# Patient Record
Sex: FEMALE | Race: WHITE | NOT HISPANIC OR LATINO
[De-identification: names, ages, dates, MRNs, and addresses within clinical notes are randomized per-mention and may not be internally consistent; named-entity substitution may affect disease eponyms.]

---

## 2019-01-15 ENCOUNTER — APPOINTMENT (OUTPATIENT)
Dept: INTERNAL MEDICINE | Facility: CLINIC | Age: 34
End: 2019-01-15
Payer: COMMERCIAL

## 2019-01-15 ENCOUNTER — TRANSCRIPTION ENCOUNTER (OUTPATIENT)
Age: 34
End: 2019-01-15

## 2019-01-15 VITALS
DIASTOLIC BLOOD PRESSURE: 78 MMHG | SYSTOLIC BLOOD PRESSURE: 118 MMHG | OXYGEN SATURATION: 97 % | HEIGHT: 64 IN | HEART RATE: 69 BPM | BODY MASS INDEX: 25.44 KG/M2 | WEIGHT: 149 LBS

## 2019-01-15 DIAGNOSIS — E61.1 IRON DEFICIENCY: ICD-10-CM

## 2019-01-15 DIAGNOSIS — Z81.4 FAMILY HISTORY OF OTHER SUBSTANCE ABUSE AND DEPENDENCE: ICD-10-CM

## 2019-01-15 DIAGNOSIS — Z00.00 ENCOUNTER FOR GENERAL ADULT MEDICAL EXAMINATION W/OUT ABNORMAL FINDINGS: ICD-10-CM

## 2019-01-15 DIAGNOSIS — Z11.4 ENCOUNTER FOR SCREENING FOR HUMAN IMMUNODEFICIENCY VIRUS [HIV]: ICD-10-CM

## 2019-01-15 DIAGNOSIS — G93.9 DISORDER OF BRAIN, UNSPECIFIED: ICD-10-CM

## 2019-01-15 DIAGNOSIS — K58.1 IRRITABLE BOWEL SYNDROME WITH CONSTIPATION: ICD-10-CM

## 2019-01-15 DIAGNOSIS — Z81.8 FAMILY HISTORY OF OTHER MENTAL AND BEHAVIORAL DISORDERS: ICD-10-CM

## 2019-01-15 DIAGNOSIS — Z82.3 FAMILY HISTORY OF STROKE: ICD-10-CM

## 2019-01-15 DIAGNOSIS — Z82.49 FAMILY HISTORY OF ISCHEMIC HEART DISEASE AND OTHER DISEASES OF THE CIRCULATORY SYSTEM: ICD-10-CM

## 2019-01-15 PROCEDURE — G0442 ANNUAL ALCOHOL SCREEN 15 MIN: CPT

## 2019-01-15 PROCEDURE — 36415 COLL VENOUS BLD VENIPUNCTURE: CPT

## 2019-01-15 PROCEDURE — 99385 PREV VISIT NEW AGE 18-39: CPT | Mod: 25

## 2019-01-15 RX ORDER — FEXOFENADINE HCL 60 MG
TABLET ORAL
Refills: 0 | Status: ACTIVE | COMMUNITY

## 2019-01-15 RX ORDER — UBIDECARENONE/VIT E ACET 100MG-5
CAPSULE ORAL
Refills: 0 | Status: ACTIVE | COMMUNITY

## 2019-01-15 NOTE — ASSESSMENT
[FreeTextEntry1] : In good health. \par Further recommendations based on labs.\par Follows with neurology regularly for monitoring of brain lesion. \par Current with gyn. \par Declined flu vaccine. \par Follow up annually and prn.

## 2019-01-15 NOTE — HEALTH RISK ASSESSMENT
[0] : 2) Feeling down, depressed, or hopeless: Not at all (0) [Patient reported PAP Smear was normal] : Patient reported PAP Smear was normal [] : No [de-identified] : occasional [[id number]] : 0

## 2019-01-15 NOTE — HISTORY OF PRESENT ILLNESS
[de-identified] : Patient presents for initial CPE. History is significant for an incidentally found brain lesion for which she follows with neurology, Dr. Bethea, regularly. Her only medications are allegra/flonase for chornic postnasal drip. She has a history of low iron and hypothyroidism that was not treated. She complains of occasional restless legs, otherwise feels well. \par She is engaged, works as a speech pathologist in a school/private practice.

## 2019-01-15 NOTE — PHYSICAL EXAM

## 2019-01-16 DIAGNOSIS — R79.89 OTHER SPECIFIED ABNORMAL FINDINGS OF BLOOD CHEMISTRY: ICD-10-CM

## 2019-01-17 ENCOUNTER — TRANSCRIPTION ENCOUNTER (OUTPATIENT)
Age: 34
End: 2019-01-17

## 2019-01-17 LAB
25(OH)D3 SERPL-MCNC: 32.1 NG/ML
ALBUMIN SERPL ELPH-MCNC: 4.3 G/DL
ALP BLD-CCNC: 36 U/L
ALT SERPL-CCNC: 18 U/L
ANION GAP SERPL CALC-SCNC: 11 MMOL/L
AST SERPL-CCNC: 21 U/L
BASOPHILS # BLD AUTO: 0.03 K/UL
BASOPHILS NFR BLD AUTO: 0.7 %
BILIRUB SERPL-MCNC: 0.5 MG/DL
BUN SERPL-MCNC: 9 MG/DL
CALCIUM SERPL-MCNC: 9.4 MG/DL
CHLORIDE SERPL-SCNC: 107 MMOL/L
CHOLEST SERPL-MCNC: 139 MG/DL
CHOLEST/HDLC SERPL: 2.2 RATIO
CO2 SERPL-SCNC: 23 MMOL/L
CREAT SERPL-MCNC: 0.78 MG/DL
EOSINOPHIL # BLD AUTO: 0.07 K/UL
EOSINOPHIL NFR BLD AUTO: 1.6 %
FERRITIN SERPL-MCNC: 33 NG/ML
GLUCOSE SERPL-MCNC: 90 MG/DL
HCT VFR BLD CALC: 44 %
HDLC SERPL-MCNC: 63 MG/DL
HGB BLD-MCNC: 14.3 G/DL
HIV1+2 AB SPEC QL IA.RAPID: NONREACTIVE
IMM GRANULOCYTES NFR BLD AUTO: 0 %
IRON SERPL-MCNC: 65 UG/DL
LDLC SERPL CALC-MCNC: 65 MG/DL
LYMPHOCYTES # BLD AUTO: 1.91 K/UL
LYMPHOCYTES NFR BLD AUTO: 42.7 %
MAN DIFF?: NORMAL
MCHC RBC-ENTMCNC: 32.4 PG
MCHC RBC-ENTMCNC: 32.5 GM/DL
MCV RBC AUTO: 99.5 FL
MONOCYTES # BLD AUTO: 0.6 K/UL
MONOCYTES NFR BLD AUTO: 13.4 %
NEUTROPHILS # BLD AUTO: 1.86 K/UL
NEUTROPHILS NFR BLD AUTO: 41.6 %
PLATELET # BLD AUTO: 280 K/UL
POTASSIUM SERPL-SCNC: 4.4 MMOL/L
PROT SERPL-MCNC: 6.7 G/DL
RBC # BLD: 4.42 M/UL
RBC # FLD: 14 %
SODIUM SERPL-SCNC: 141 MMOL/L
T4 FREE SERPL-MCNC: 1.2 NG/DL
TRIGL SERPL-MCNC: 54 MG/DL
TSH SERPL-ACNC: 6.91 UIU/ML
VIT B12 SERPL-MCNC: 633 PG/ML
WBC # FLD AUTO: 4.47 K/UL

## 2019-02-25 ENCOUNTER — TRANSCRIPTION ENCOUNTER (OUTPATIENT)
Age: 34
End: 2019-02-25

## 2019-03-04 ENCOUNTER — TRANSCRIPTION ENCOUNTER (OUTPATIENT)
Age: 34
End: 2019-03-04

## 2019-03-22 ENCOUNTER — APPOINTMENT (OUTPATIENT)
Dept: INTERNAL MEDICINE | Facility: CLINIC | Age: 34
End: 2019-03-22
Payer: COMMERCIAL

## 2019-03-22 VITALS
TEMPERATURE: 97.6 F | HEIGHT: 64 IN | BODY MASS INDEX: 24.59 KG/M2 | WEIGHT: 144 LBS | OXYGEN SATURATION: 98 % | DIASTOLIC BLOOD PRESSURE: 78 MMHG | HEART RATE: 81 BPM | SYSTOLIC BLOOD PRESSURE: 118 MMHG

## 2019-03-22 PROCEDURE — 99214 OFFICE O/P EST MOD 30 MIN: CPT

## 2019-03-22 RX ORDER — ALBUTEROL SULFATE 90 UG/1
108 (90 BASE) AEROSOL, METERED RESPIRATORY (INHALATION) EVERY 4 HOURS
Qty: 1 | Refills: 0 | Status: ACTIVE | COMMUNITY
Start: 2019-03-22 | End: 1900-01-01

## 2019-03-25 NOTE — ASSESSMENT
[FreeTextEntry1] : Lung clear, but worse at night. Rx given for albuterol inhaler prn and codeine for use at night. Call if no improvement.

## 2019-03-25 NOTE — HISTORY OF PRESENT ILLNESS
[FreeTextEntry8] : Patient complains of dry cough worse at night for a week. She went to urgent care last weekend and was given an rx for medrol with no relief. Yesterday felt like she couldn't take a deep breath. No wheezing, fever, chills, myalgias. Has had sinus congestion on and off for a few months, taking Allergra D, nasal spray. She is a never smoker, no history of asthma.

## 2019-03-25 NOTE — PHYSICAL EXAM
[No Acute Distress] : no acute distress [Well Nourished] : well nourished [Well Developed] : well developed [No Respiratory Distress] : no respiratory distress  [Clear to Auscultation] : lungs were clear to auscultation bilaterally [No Accessory Muscle Use] : no accessory muscle use [Normal Rate] : normal rate  [Regular Rhythm] : with a regular rhythm [Normal S1, S2] : normal S1 and S2 [No Murmur] : no murmur heard [Normal Affect] : the affect was normal [Normal Insight/Judgement] : insight and judgment were intact [de-identified] : pnd

## 2019-03-25 NOTE — REVIEW OF SYSTEMS
[Nasal Discharge] : nasal discharge [Shortness Of Breath] : shortness of breath [Wheezing] : no wheezing [Cough] : cough [Negative] : Heme/Lymph

## 2019-05-09 ENCOUNTER — APPOINTMENT (OUTPATIENT)
Dept: INTERNAL MEDICINE | Facility: CLINIC | Age: 34
End: 2019-05-09

## 2019-05-17 ENCOUNTER — LABORATORY RESULT (OUTPATIENT)
Age: 34
End: 2019-05-17

## 2019-05-17 ENCOUNTER — APPOINTMENT (OUTPATIENT)
Dept: INTERNAL MEDICINE | Facility: CLINIC | Age: 34
End: 2019-05-17
Payer: COMMERCIAL

## 2019-05-17 VITALS
BODY MASS INDEX: 24.59 KG/M2 | WEIGHT: 144 LBS | HEART RATE: 85 BPM | HEIGHT: 64 IN | SYSTOLIC BLOOD PRESSURE: 124 MMHG | TEMPERATURE: 97.7 F | DIASTOLIC BLOOD PRESSURE: 80 MMHG | OXYGEN SATURATION: 98 %

## 2019-05-17 DIAGNOSIS — J30.9 ALLERGIC RHINITIS, UNSPECIFIED: ICD-10-CM

## 2019-05-17 DIAGNOSIS — R05 COUGH: ICD-10-CM

## 2019-05-17 PROCEDURE — 36415 COLL VENOUS BLD VENIPUNCTURE: CPT

## 2019-05-17 PROCEDURE — 94010 BREATHING CAPACITY TEST: CPT

## 2019-05-17 PROCEDURE — 99214 OFFICE O/P EST MOD 30 MIN: CPT | Mod: 25

## 2019-05-17 RX ORDER — BUDESONIDE 90 UG/1
90 AEROSOL, POWDER RESPIRATORY (INHALATION)
Qty: 1 | Refills: 5 | Status: ACTIVE | COMMUNITY
Start: 2019-05-17 | End: 1900-01-01

## 2019-05-17 RX ORDER — GUAIFENESIN AND CODEINE PHOSPHATE 100; 10 MG/5ML; MG/5ML
100-10 SYRUP ORAL
Qty: 150 | Refills: 0 | Status: DISCONTINUED | COMMUNITY
Start: 2019-03-22 | End: 2019-05-17

## 2019-05-17 RX ORDER — MONTELUKAST 10 MG/1
10 TABLET, FILM COATED ORAL DAILY
Qty: 30 | Refills: 5 | Status: ACTIVE | COMMUNITY
Start: 2019-05-17 | End: 1900-01-01

## 2019-05-17 NOTE — END OF VISIT
[FreeTextEntry3] : All medical record entries made by the Scribe were at my, Dr. Ivey's, direction and personally dictated by me on [5/17/19]. I have reviewed the chart and agree that the record accurately reflects my personal performance of the history, physical exam, assessment and plan. I have also personally directed, reviewed, and agreed with the chart.\par

## 2019-05-17 NOTE — ADDENDUM
[FreeTextEntry1] : I, Mary Lange, acted solely as a scribe for Dr. Ivey on this date [5/17/19]. \par

## 2019-05-17 NOTE — PHYSICAL EXAM
[No Acute Distress] : no acute distress [Well Nourished] : well nourished [Well Developed] : well developed [Normal Outer Ear/Nose] : the outer ears and nose were normal in appearance [No Respiratory Distress] : no respiratory distress  [Normal Oropharynx] : the oropharynx was normal [No Accessory Muscle Use] : no accessory muscle use [Clear to Auscultation] : lungs were clear to auscultation bilaterally [Regular Rhythm] : with a regular rhythm [Normal Rate] : normal rate  [Normal S1, S2] : normal S1 and S2 [No Murmur] : no murmur heard [Alert and Oriented x3] : oriented to person, place, and time [Normal Affect] : the affect was normal [Normal Insight/Judgement] : insight and judgment were intact [de-identified] : PND noted, mild maxillary tenderness  [de-identified] : right sided, tender, 1cm, swollen submandibular lymphadenopathy

## 2019-05-17 NOTE — REVIEW OF SYSTEMS
[Sore Throat] : sore throat [Postnasal Drip] : postnasal drip [Shortness Of Breath] : shortness of breath [Cough] : cough [Wheezing] : wheezing [Swollen Glands] : swollen glands [Fever] : no fever [Chills] : no chills

## 2019-05-17 NOTE — ASSESSMENT
[FreeTextEntry1] : Rx given for singulair for cough, allergies. \par Rx given for pulmicort inhaler. Use daily, rinse out mouth after use. \par Use albuterol inhaler if needed. \par Follow up with ENT for possible deviated septum surgery, will consider allergy eval if no improvement in symptoms. \par Labs drawn in office. Further recommendations based on lab results.

## 2019-05-17 NOTE — HISTORY OF PRESENT ILLNESS
[FreeTextEntry8] : Patient presents with a cough, sore throat, and swollen glands for the past few weeks. She was here two months ago with a cough and was given a course of steroids which improved the cough for a few days. She went to her ENT last week and he told her to stop taking OTC medications. He gave her a steroid injection which helped her nasal congestion for one day. Her swollen glands and sore throat have resolved, but the cough has persisted. She feels like she has a mild wheeze. She says the cough is worse in the morning and at night, and sometimes she feels like the cough makes it difficult to catch her breath. She has had similar symptoms for years and has undergone allergy workup that was negative in the past. No fever, no chills. No hx of asthma. No known allergies. Never a smoker.

## 2019-05-23 ENCOUNTER — TRANSCRIPTION ENCOUNTER (OUTPATIENT)
Age: 34
End: 2019-05-23

## 2019-05-23 LAB
A ALTERNATA IGE QN: <0.1 KUA/L
A ALTERNATA IGE QN: <0.1 KUA/L
A FUMIGATUS IGE QN: <0.1 KUA/L
A FUMIGATUS IGE QN: <0.1 KUA/L
C ALBICANS IGE QN: <0.1 KUA/L
C HERBARUM IGE QN: <0.1 KUA/L
C HERBARUM IGE QN: <0.1 KUA/L
CAT DANDER IGE QN: <0.1 KUA/L
COMMON RAGWEED IGE QN: <0.1 KUA/L
D FARINAE IGE QN: <0.1 KUA/L
D PTERONYSS IGE QN: <0.1 KUA/L
DEPRECATED A ALTERNATA IGE RAST QL: 0
DEPRECATED A ALTERNATA IGE RAST QL: 0
DEPRECATED A FUMIGATUS IGE RAST QL: 0
DEPRECATED A FUMIGATUS IGE RAST QL: 0
DEPRECATED C ALBICANS IGE RAST QL: 0
DEPRECATED C HERBARUM IGE RAST QL: 0
DEPRECATED C HERBARUM IGE RAST QL: 0
DEPRECATED CAT DANDER IGE RAST QL: 0
DEPRECATED COMMON RAGWEED IGE RAST QL: 0
DEPRECATED D FARINAE IGE RAST QL: 0
DEPRECATED D PTERONYSS IGE RAST QL: 0
DEPRECATED DOG DANDER IGE RAST QL: 0
DEPRECATED M RACEMOSUS IGE RAST QL: 0
DEPRECATED P NOTATUM IGE RAST QL: 0
DEPRECATED ROACH IGE RAST QL: 0
DEPRECATED S ROSTRATA IGE RAST QL: 0
DEPRECATED TIMOTHY IGE RAST QL: 0
DEPRECATED WHITE OAK IGE RAST QL: 0
DOG DANDER IGE QN: <0.1 KUA/L
M RACEMOSUS IGE QN: <0.1 KUA/L
P NOTATUM IGE QN: <0.1 KUA/L
ROACH IGE QN: <0.1 KUA/L
S ROSTRATA IGE QN: <0.1 KUA/L
TIMOTHY IGE QN: <0.1 KUA/L
WHITE OAK IGE QN: <0.1 KUA/L

## 2019-05-30 ENCOUNTER — RESULT REVIEW (OUTPATIENT)
Age: 34
End: 2019-05-30

## 2019-07-10 ENCOUNTER — APPOINTMENT (OUTPATIENT)
Dept: INTERNAL MEDICINE | Facility: CLINIC | Age: 34
End: 2019-07-10

## 2019-07-10 ENCOUNTER — APPOINTMENT (OUTPATIENT)
Dept: INTERNAL MEDICINE | Facility: CLINIC | Age: 34
End: 2019-07-10
Payer: COMMERCIAL

## 2019-07-10 VITALS
OXYGEN SATURATION: 98 % | BODY MASS INDEX: 24.24 KG/M2 | DIASTOLIC BLOOD PRESSURE: 74 MMHG | WEIGHT: 142 LBS | HEIGHT: 64 IN | SYSTOLIC BLOOD PRESSURE: 106 MMHG | RESPIRATION RATE: 13 BRPM | HEART RATE: 81 BPM

## 2019-07-10 DIAGNOSIS — Z71.89 OTHER SPECIFIED COUNSELING: ICD-10-CM

## 2019-07-10 DIAGNOSIS — T75.3XXA MOTION SICKNESS, INITIAL ENCOUNTER: ICD-10-CM

## 2019-07-10 DIAGNOSIS — G25.81 RESTLESS LEGS SYNDROME: ICD-10-CM

## 2019-07-10 PROCEDURE — 99214 OFFICE O/P EST MOD 30 MIN: CPT

## 2019-07-10 RX ORDER — ROPINIROLE 0.25 MG/1
0.25 TABLET, FILM COATED ORAL
Qty: 30 | Refills: 0 | Status: ACTIVE | COMMUNITY
Start: 2019-07-10 | End: 1900-01-01

## 2019-07-10 RX ORDER — SCOPOLAMINE 1.5 MG/1
1 PATCH, EXTENDED RELEASE TRANSDERMAL
Qty: 1 | Refills: 0 | Status: ACTIVE | COMMUNITY
Start: 2019-07-10 | End: 1900-01-01

## 2019-07-11 PROBLEM — Z71.89 TRAVEL ADVICE ENCOUNTER: Status: ACTIVE | Noted: 2019-07-11

## 2019-07-11 PROBLEM — G25.81 RESTLESS LEGS: Status: ACTIVE | Noted: 2019-01-15

## 2019-07-11 PROBLEM — T75.3XXA MOTION SICKNESS: Status: ACTIVE | Noted: 2019-07-10

## 2019-07-11 NOTE — END OF VISIT
[Time Spent: ___ minutes] : I have spent [unfilled] minutes of face to face time with the patient [FreeTextEntry3] : All medical record entries made by the Scribe were at my, Dr. Ivey's, direction and personally dictated by me on [7/10/2019]. I have reviewed the chart and agree that the record accurately reflects my personal performance of the history, physical exam, assessment and plan. I have also personally directed, reviewed and agreed with the chart.

## 2019-07-11 NOTE — PHYSICAL EXAM
[No Acute Distress] : no acute distress [Well Nourished] : well nourished [Well Developed] : well developed [Well-Appearing] : well-appearing [Normal Affect] : the affect was normal [Alert and Oriented x3] : oriented to person, place, and time [Normal Insight/Judgement] : insight and judgment were intact [Normal Rate] : normal rate  [No Edema] : there was no peripheral edema [No Respiratory Distress] : no respiratory distress

## 2019-07-11 NOTE — ASSESSMENT
[FreeTextEntry1] : Rx given for scopolamine patch for motion sickness. \par Rx given for ropinirole and restless legs.\par Discussed that she should see a travel clinic for comprehensive travel and vaccine counseling. She declines, states she is just interested in hepatitis A and typhoid vaccines. Rx sent to [facility], she will return next week for administration.

## 2019-07-11 NOTE — HISTORY OF PRESENT ILLNESS
[FreeTextEntry8] : Patient has several concerns regarding travel to [place] next month.\par #1 - she complains of restless legs, worse at night. B12 & ferritin were normal in the past. She is concerned about being symptomatic on the flight and requests medication to treat it. \par #2 - She will be travelling on boats and is concerned about motion sickness. She requests medication to treat it. \par #3 - She is interested in travel vaccinations, specifically typhoid and hepatitis A. \par

## 2019-07-17 ENCOUNTER — APPOINTMENT (OUTPATIENT)
Dept: INTERNAL MEDICINE | Facility: CLINIC | Age: 34
End: 2019-07-17

## 2019-07-17 ENCOUNTER — APPOINTMENT (OUTPATIENT)
Dept: INTERNAL MEDICINE | Facility: CLINIC | Age: 34
End: 2019-07-17
Payer: COMMERCIAL

## 2019-07-17 VITALS
OXYGEN SATURATION: 98 % | HEART RATE: 79 BPM | RESPIRATION RATE: 16 BRPM | SYSTOLIC BLOOD PRESSURE: 108 MMHG | HEIGHT: 64 IN | DIASTOLIC BLOOD PRESSURE: 70 MMHG | BODY MASS INDEX: 24.59 KG/M2 | WEIGHT: 144 LBS

## 2019-07-17 DIAGNOSIS — Z23 ENCOUNTER FOR IMMUNIZATION: ICD-10-CM

## 2019-07-17 PROCEDURE — 90472 IMMUNIZATION ADMIN EACH ADD: CPT

## 2019-07-17 PROCEDURE — 90691 TYPHOID VACCINE IM: CPT | Mod: NC

## 2019-07-17 PROCEDURE — 90471 IMMUNIZATION ADMIN: CPT

## 2019-07-17 PROCEDURE — 90632 HEPA VACCINE ADULT IM: CPT | Mod: NC

## 2019-07-17 RX ORDER — SALMONELLA TYPHI TY2 VI POLYSACCHARIDE ANTIGEN 25 UG/.5ML
25 INJECTION, SOLUTION INTRAMUSCULAR ONCE
Qty: 1 | Refills: 0 | Status: DISCONTINUED | COMMUNITY
Start: 2019-07-11 | End: 2019-07-17

## 2019-07-17 NOTE — ASSESSMENT
[FreeTextEntry1] : Patient presents for Hep A and typhoid vaccines. She has the vaccines with her. \par Havrix given in left deltoid with no localized reaction. \par Typhoid given in right deltoid with no localized reaction.

## 2019-08-22 ENCOUNTER — APPOINTMENT (OUTPATIENT)
Dept: INTERNAL MEDICINE | Facility: CLINIC | Age: 34
End: 2019-08-22

## 2019-10-29 ENCOUNTER — APPOINTMENT (OUTPATIENT)
Dept: INTERNAL MEDICINE | Facility: CLINIC | Age: 34
End: 2019-10-29
Payer: COMMERCIAL

## 2019-10-29 VITALS
DIASTOLIC BLOOD PRESSURE: 73 MMHG | SYSTOLIC BLOOD PRESSURE: 110 MMHG | HEART RATE: 79 BPM | WEIGHT: 150 LBS | BODY MASS INDEX: 25.61 KG/M2 | OXYGEN SATURATION: 99 % | HEIGHT: 64 IN

## 2019-10-29 DIAGNOSIS — M54.2 CERVICALGIA: ICD-10-CM

## 2019-10-29 DIAGNOSIS — M50.30 OTHER CERVICAL DISC DEGENERATION, UNSPECIFIED CERVICAL REGION: ICD-10-CM

## 2019-10-29 PROCEDURE — 99214 OFFICE O/P EST MOD 30 MIN: CPT

## 2019-10-29 RX ORDER — HEPATITIS A VIRUS VACCINE 1440/ML
1440 VIAL (ML) INTRAMUSCULAR ONCE
Qty: 1 | Refills: 0 | Status: DISCONTINUED | COMMUNITY
Start: 2019-07-11 | End: 2019-10-29

## 2019-10-29 RX ORDER — CYCLOBENZAPRINE HYDROCHLORIDE 10 MG/1
10 TABLET, FILM COATED ORAL 3 TIMES DAILY
Qty: 30 | Refills: 0 | Status: ACTIVE | COMMUNITY
Start: 2019-10-29 | End: 1900-01-01

## 2019-10-29 NOTE — ADDENDUM
[FreeTextEntry1] : I, Alicia Hill, acted solely as a scribe for Dr. Ivey on this date [10/29/2019].

## 2019-10-29 NOTE — ASSESSMENT
[FreeTextEntry1] : Rx given for physical therapy, flexeril. Recommended ibuprofen or naproxen prn for pain. Take with food.\par

## 2019-10-29 NOTE — END OF VISIT
[FreeTextEntry3] : All medical record entries made by the Scribe were at my, Dr. Ivey's, direction and personally dictated by me on [10/29/2019]. I have reviewed the chart and agree that the record accurately reflects my personal performance of the history, physical exam, assessment and plan. I have also personally directed, reviewed and agreed with the chart.

## 2019-10-29 NOTE — REVIEW OF SYSTEMS
[Muscle Weakness] : muscle weakness [Negative] : Constitutional [FreeTextEntry9] : neck pain, shoulder pain

## 2019-10-29 NOTE — HISTORY OF PRESENT ILLNESS
[FreeTextEntry8] : Patient presents with chronic neck pain that has been getting worse over the past 2 months. She also complains of arm weakness, shoulder pain, and a sensation that radiates down her arms. She had severe exacerbation of the pain 2 days ago after scrubbing the bathroom. Her range of motion in her neck was limited. History is significant for 2 herniated spinal disks and she has had similar symptoms before. Imaging March 2017 showed herniation at C5/C6 with mild compression of right side of the spinal cord and herniation at C6/C7 without compression of the spinal cord. She has been to the chiropractor in the past with relief. She has been working out more lately, but may cut back due to pain.

## 2019-10-29 NOTE — PHYSICAL EXAM
[No Acute Distress] : no acute distress [Well Nourished] : well nourished [Well Developed] : well developed [Well-Appearing] : well-appearing [No Respiratory Distress] : no respiratory distress  [No Accessory Muscle Use] : no accessory muscle use [Clear to Auscultation] : lungs were clear to auscultation bilaterally [Normal Rate] : normal rate  [Regular Rhythm] : with a regular rhythm [Normal S1, S2] : normal S1 and S2 [No Murmur] : no murmur heard [No Edema] : there was no peripheral edema [Normal Affect] : the affect was normal [Normal Insight/Judgement] : insight and judgment were intact [de-identified] : decreased ROM with rotation to right and extension of neck

## 2020-03-04 ENCOUNTER — APPOINTMENT (OUTPATIENT)
Dept: DERMATOLOGY | Facility: CLINIC | Age: 35
End: 2020-03-04
Payer: COMMERCIAL

## 2020-03-04 PROCEDURE — 99203 OFFICE O/P NEW LOW 30 MIN: CPT

## 2020-12-01 ENCOUNTER — ASOB RESULT (OUTPATIENT)
Age: 35
End: 2020-12-01

## 2020-12-01 ENCOUNTER — APPOINTMENT (OUTPATIENT)
Dept: ANTEPARTUM | Facility: CLINIC | Age: 35
End: 2020-12-01
Payer: COMMERCIAL

## 2020-12-01 PROCEDURE — 76811 OB US DETAILED SNGL FETUS: CPT | Mod: 59

## 2020-12-01 PROCEDURE — 99072 ADDL SUPL MATRL&STAF TM PHE: CPT

## 2020-12-04 ENCOUNTER — TRANSCRIPTION ENCOUNTER (OUTPATIENT)
Age: 35
End: 2020-12-04

## 2020-12-14 ENCOUNTER — TRANSCRIPTION ENCOUNTER (OUTPATIENT)
Age: 35
End: 2020-12-14

## 2020-12-23 ENCOUNTER — TRANSCRIPTION ENCOUNTER (OUTPATIENT)
Age: 35
End: 2020-12-23

## 2021-01-07 ENCOUNTER — EMERGENCY (EMERGENCY)
Facility: HOSPITAL | Age: 36
LOS: 1 days | Discharge: HOME | End: 2021-01-07
Attending: STUDENT IN AN ORGANIZED HEALTH CARE EDUCATION/TRAINING PROGRAM
Payer: COMMERCIAL

## 2021-01-07 ENCOUNTER — OUTPATIENT (OUTPATIENT)
Dept: OUTPATIENT SERVICES | Facility: HOSPITAL | Age: 36
LOS: 1 days | End: 2021-01-07
Payer: COMMERCIAL

## 2021-01-07 VITALS
HEART RATE: 91 BPM | DIASTOLIC BLOOD PRESSURE: 73 MMHG | OXYGEN SATURATION: 100 % | RESPIRATION RATE: 16 BRPM | SYSTOLIC BLOOD PRESSURE: 118 MMHG | TEMPERATURE: 98 F

## 2021-01-07 VITALS
TEMPERATURE: 98 F | OXYGEN SATURATION: 98 % | SYSTOLIC BLOOD PRESSURE: 104 MMHG | RESPIRATION RATE: 17 BRPM | DIASTOLIC BLOOD PRESSURE: 74 MMHG | HEART RATE: 81 BPM

## 2021-01-07 VITALS — OXYGEN SATURATION: 96 % | HEART RATE: 84 BPM

## 2021-01-07 VITALS — SYSTOLIC BLOOD PRESSURE: 115 MMHG | DIASTOLIC BLOOD PRESSURE: 70 MMHG

## 2021-01-07 DIAGNOSIS — O47.1 FALSE LABOR AT OR AFTER 37 COMPLETED WEEKS OF GESTATION: ICD-10-CM

## 2021-01-07 DIAGNOSIS — O47.02 FALSE LABOR BEFORE 37 COMPLETED WEEKS OF GESTATION, SECOND TRIMESTER: ICD-10-CM

## 2021-01-07 LAB
ALBUMIN SERPL ELPH-MCNC: 3.5 G/DL — SIGNIFICANT CHANGE UP (ref 3.3–5.2)
ALP SERPL-CCNC: 57 U/L — SIGNIFICANT CHANGE UP (ref 40–120)
ALT FLD-CCNC: 16 U/L — SIGNIFICANT CHANGE UP
ANION GAP SERPL CALC-SCNC: 10 MMOL/L — SIGNIFICANT CHANGE UP (ref 5–17)
APPEARANCE UR: CLEAR — SIGNIFICANT CHANGE UP
AST SERPL-CCNC: 18 U/L — SIGNIFICANT CHANGE UP
BACTERIA # UR AUTO: ABNORMAL
BASOPHILS # BLD AUTO: 0.04 K/UL — SIGNIFICANT CHANGE UP (ref 0–0.2)
BASOPHILS NFR BLD AUTO: 0.3 % — SIGNIFICANT CHANGE UP (ref 0–2)
BILIRUB SERPL-MCNC: 0.2 MG/DL — LOW (ref 0.4–2)
BILIRUB UR-MCNC: NEGATIVE — SIGNIFICANT CHANGE UP
BUN SERPL-MCNC: 7 MG/DL — LOW (ref 8–20)
CALCIUM SERPL-MCNC: 9.3 MG/DL — SIGNIFICANT CHANGE UP (ref 8.6–10.2)
CHLORIDE SERPL-SCNC: 103 MMOL/L — SIGNIFICANT CHANGE UP (ref 98–107)
CO2 SERPL-SCNC: 22 MMOL/L — SIGNIFICANT CHANGE UP (ref 22–29)
COLOR SPEC: YELLOW — SIGNIFICANT CHANGE UP
CREAT SERPL-MCNC: 0.5 MG/DL — SIGNIFICANT CHANGE UP (ref 0.5–1.3)
CRP SERPL-MCNC: <0.3 MG/DL — SIGNIFICANT CHANGE UP (ref 0–0.4)
D DIMER BLD IA.RAPID-MCNC: 243 NG/ML DDU — HIGH
DIFF PNL FLD: NEGATIVE — SIGNIFICANT CHANGE UP
EOSINOPHIL # BLD AUTO: 0.05 K/UL — SIGNIFICANT CHANGE UP (ref 0–0.5)
EOSINOPHIL NFR BLD AUTO: 0.3 % — SIGNIFICANT CHANGE UP (ref 0–6)
EPI CELLS # UR: SIGNIFICANT CHANGE UP
FERRITIN SERPL-MCNC: 18 NG/ML — SIGNIFICANT CHANGE UP (ref 15–150)
GLUCOSE SERPL-MCNC: 72 MG/DL — SIGNIFICANT CHANGE UP (ref 70–99)
GLUCOSE UR QL: NEGATIVE MG/DL — SIGNIFICANT CHANGE UP
HCT VFR BLD CALC: 36.7 % — SIGNIFICANT CHANGE UP (ref 34.5–45)
HGB BLD-MCNC: 12.4 G/DL — SIGNIFICANT CHANGE UP (ref 11.5–15.5)
IMM GRANULOCYTES NFR BLD AUTO: 0.5 % — SIGNIFICANT CHANGE UP (ref 0–1.5)
KETONES UR-MCNC: NEGATIVE — SIGNIFICANT CHANGE UP
LEUKOCYTE ESTERASE UR-ACNC: ABNORMAL
LYMPHOCYTES # BLD AUTO: 15 % — SIGNIFICANT CHANGE UP (ref 13–44)
LYMPHOCYTES # BLD AUTO: 2.2 K/UL — SIGNIFICANT CHANGE UP (ref 1–3.3)
MCHC RBC-ENTMCNC: 33.3 PG — SIGNIFICANT CHANGE UP (ref 27–34)
MCHC RBC-ENTMCNC: 33.8 GM/DL — SIGNIFICANT CHANGE UP (ref 32–36)
MCV RBC AUTO: 98.7 FL — SIGNIFICANT CHANGE UP (ref 80–100)
MONOCYTES # BLD AUTO: 1.26 K/UL — HIGH (ref 0–0.9)
MONOCYTES NFR BLD AUTO: 8.6 % — SIGNIFICANT CHANGE UP (ref 2–14)
NEUTROPHILS # BLD AUTO: 11.05 K/UL — HIGH (ref 1.8–7.4)
NEUTROPHILS NFR BLD AUTO: 75.3 % — SIGNIFICANT CHANGE UP (ref 43–77)
NITRITE UR-MCNC: NEGATIVE — SIGNIFICANT CHANGE UP
NT-PROBNP SERPL-SCNC: 42 PG/ML — SIGNIFICANT CHANGE UP (ref 0–300)
PH UR: 7 — SIGNIFICANT CHANGE UP (ref 5–8)
PLATELET # BLD AUTO: 245 K/UL — SIGNIFICANT CHANGE UP (ref 150–400)
POTASSIUM SERPL-MCNC: 3.6 MMOL/L — SIGNIFICANT CHANGE UP (ref 3.5–5.3)
POTASSIUM SERPL-SCNC: 3.6 MMOL/L — SIGNIFICANT CHANGE UP (ref 3.5–5.3)
PROCALCITONIN SERPL-MCNC: 0.03 NG/ML — SIGNIFICANT CHANGE UP (ref 0.02–0.1)
PROT SERPL-MCNC: 6.2 G/DL — LOW (ref 6.6–8.7)
PROT UR-MCNC: NEGATIVE MG/DL — SIGNIFICANT CHANGE UP
RBC # BLD: 3.72 M/UL — LOW (ref 3.8–5.2)
RBC # FLD: 12.4 % — SIGNIFICANT CHANGE UP (ref 10.3–14.5)
RBC CASTS # UR COMP ASSIST: SIGNIFICANT CHANGE UP /HPF (ref 0–4)
SARS-COV-2 RNA SPEC QL NAA+PROBE: SIGNIFICANT CHANGE UP
SODIUM SERPL-SCNC: 135 MMOL/L — SIGNIFICANT CHANGE UP (ref 135–145)
SP GR SPEC: 1.01 — SIGNIFICANT CHANGE UP (ref 1.01–1.02)
TROPONIN T SERPL-MCNC: <0.01 NG/ML — SIGNIFICANT CHANGE UP (ref 0–0.06)
UROBILINOGEN FLD QL: NEGATIVE MG/DL — SIGNIFICANT CHANGE UP
WBC # BLD: 14.68 K/UL — HIGH (ref 3.8–10.5)
WBC # FLD AUTO: 14.68 K/UL — HIGH (ref 3.8–10.5)
WBC UR QL: SIGNIFICANT CHANGE UP

## 2021-01-07 PROCEDURE — 93970 EXTREMITY STUDY: CPT

## 2021-01-07 PROCEDURE — 59025 FETAL NON-STRESS TEST: CPT

## 2021-01-07 PROCEDURE — 93970 EXTREMITY STUDY: CPT | Mod: 26

## 2021-01-07 PROCEDURE — 84484 ASSAY OF TROPONIN QUANT: CPT

## 2021-01-07 PROCEDURE — 99285 EMERGENCY DEPT VISIT HI MDM: CPT

## 2021-01-07 PROCEDURE — 93010 ELECTROCARDIOGRAM REPORT: CPT

## 2021-01-07 PROCEDURE — 84145 PROCALCITONIN (PCT): CPT

## 2021-01-07 PROCEDURE — 85379 FIBRIN DEGRADATION QUANT: CPT

## 2021-01-07 PROCEDURE — 80053 COMPREHEN METABOLIC PANEL: CPT

## 2021-01-07 PROCEDURE — 82728 ASSAY OF FERRITIN: CPT

## 2021-01-07 PROCEDURE — 86769 SARS-COV-2 COVID-19 ANTIBODY: CPT

## 2021-01-07 PROCEDURE — 85025 COMPLETE CBC W/AUTO DIFF WBC: CPT

## 2021-01-07 PROCEDURE — 93005 ELECTROCARDIOGRAM TRACING: CPT

## 2021-01-07 PROCEDURE — 71275 CT ANGIOGRAPHY CHEST: CPT | Mod: 26

## 2021-01-07 PROCEDURE — U0005: CPT

## 2021-01-07 PROCEDURE — 81001 URINALYSIS AUTO W/SCOPE: CPT

## 2021-01-07 PROCEDURE — 36415 COLL VENOUS BLD VENIPUNCTURE: CPT

## 2021-01-07 PROCEDURE — 86140 C-REACTIVE PROTEIN: CPT

## 2021-01-07 PROCEDURE — 83880 ASSAY OF NATRIURETIC PEPTIDE: CPT

## 2021-01-07 PROCEDURE — 71275 CT ANGIOGRAPHY CHEST: CPT

## 2021-01-07 PROCEDURE — U0003: CPT

## 2021-01-07 PROCEDURE — 99284 EMERGENCY DEPT VISIT MOD MDM: CPT | Mod: 25

## 2021-01-07 PROCEDURE — G0463: CPT

## 2021-01-07 NOTE — ED PROVIDER NOTE - NSFOLLOWUPINSTRUCTIONS_ED_ALL_ED_FT
Chest Pain    Chest pain can be caused by many different conditions which may or may not be dangerous. Causes include heartburn, lung infections, heart attack, blood clot in lungs, skin infections, strain or damage to muscle, cartilage, or bones, etc. In addition to a history and physical examination, an electrocardiogram (ECG) or other lab tests may have been performed to determine the cause of your chest pain. Follow up with your primary care provider or with a cardiologist as instructed.     SEEK IMMEDIATE MEDICAL CARE IF YOU HAVE ANY OF THE FOLLOWING SYMPTOMS: worsening chest pain, coughing up blood, unexplained back/neck/jaw pain, severe abdominal pain, dizziness or lightheadedness, fainting, shortness of breath, sweaty or clammy skin, vomiting, or racing heart beat. These symptoms may represent a serious problem that is an emergency. Do not wait to see if the symptoms will go away. Get medical help right away. Call 911 and do not drive yourself to the hospital.    -Please follow-up with your primary care doctor in the next 2 days.  Please call tomorrow for an appointment.  If you cannot follow-up with your primary care doctor please return to the ED for any urgent issues.  - You were given a copy of the tests performed today.  Please bring the results with you and review them with your primary care doctor.  - If you have any worsening of symptoms or any other concerns please return to the ED immediately.  - Please continue taking your home medications as directed.

## 2021-01-07 NOTE — ED ADULT TRIAGE NOTE - CHIEF COMPLAINT QUOTE
Patient states that she is 27 weeks pregnant and was diagnosed with COVID 18 days ago. Patient states that she has been having chest congestions and slight SOB. Patient was seen by L & D and sent to the ED.

## 2021-01-07 NOTE — ED ADULT NURSE NOTE - OBJECTIVE STATEMENT
pt A&O x 4 came to ED c/o increased SOB and chest discomfort. Pt tested covid + on 12/21 and states "I still feel the way I did when I first got it." Pt 27 weeks pregnant with prenatal care and no known OBGYN issues with pregnancy. Fetal heartbeat present on bedside ultrasound. Pt denies any N/V/D, HA, dizziness, blurred vision, numbness/tingling,  symptoms. No s/s of respiratory distress noted- respirations even & unlabored. NSR on monitor. Safety maintained.

## 2021-01-07 NOTE — ED PROVIDER NOTE - PROGRESS NOTE DETAILS
Reviewed risks and benefits of CT scan for evaluation of PE. Pt consents to CTA chest if dimer is elevated. - Delano Healy, PGY-2 long discussion with patient regarding CT in 2nd trimester pregnancy. discussed risks and benefits extensively and answered questions. patient consenting to CTA to r/o PE PT seen and reassessed.  Patient symptomatically improved.   AAOX3, NAD, VSS.  Discussed test results w/ patient, given copy of results. Patient verbalized understanding of hospital course and outpatient plans, has decisional making capacity.  Will follow-up with Primary care doctor in the next 2 days; patient will call for an appointment. Will return to the ED if there is any worsening of symptoms.  Patient able to ambulate w/o difficulty, is tolerating PO intake

## 2021-01-07 NOTE — ED PROVIDER NOTE - CLINICAL SUMMARY MEDICAL DECISION MAKING FREE TEXT BOX
currently 27 weeks pregnant, covid+, pw chest pain and sob x1 day. Will check labs, ekg, trop, dimer, bnp, reeval. Plan for CTA if dimer is positive.

## 2021-01-07 NOTE — ED PROVIDER NOTE - PHYSICAL EXAMINATION
Gen: no acute distress  Head: normocephalic, atraumatic  Lung: CTAB, no respiratory distress, no wheezing, rales, rhonchi  CV: normal s1/s2, rrr,   Abd: gravid, soft, non-tender, non-distended  MSK: No edema, no visible deformities, full range of motion in all 4 extremities  Neuro: No focal neurologic deficits  Skin: No rash   Psych: normal affect

## 2021-01-07 NOTE — OB PROVIDER TRIAGE NOTE - HISTORY OF PRESENT ILLNESS
36yo  @ 26.2w who comes into triage at the recommendation of her primary OBGYN Dr. Soliman due to SOB on exertion and concern for PE. Patient recently tested positive for COVID 18 days ago. Denies any vaginal bleeding, loss of fluid, contractions. Patient reports +FM.     PMH: COVID +  PSH: none  All: NKDA  MEd: PNV    Vital Signs Last 24 Hrs  T(C): 37.1 (2021 14:04), Max: 37.1 (2021 14:04)  T(F): 98.7 (2021 14:04), Max: 98.7 (2021 14:04)  HR: 85 (2021 14:34) (76 - 89)  BP: 115/70 (2021 14:04) (115/70 - 115/70)  BP(mean): --  RR: 18 (2021 14:04) (18 - 18)  SpO2: 97% (2021 14:29) (92% - 99%)    Gen: NAD  CV: rrr  Lung: nonlaboured breathing  ABd: soft, gravid  SVE: deferred    NST: reactive, accels, no decels, baseline 140

## 2021-01-07 NOTE — ED ADULT NURSE NOTE - NSIMPLEMENTINTERV_GEN_ALL_ED
Implemented All Universal Safety Interventions:  [place] to call system. Call bell, personal items and telephone within reach. Instruct patient to call for assistance. Room bathroom lighting operational. Non-slip footwear when patient is off stretcher. Physically safe environment: no spills, clutter or unnecessary equipment. Stretcher in lowest position, wheels locked, appropriate side rails in place.

## 2021-01-07 NOTE — ED PROVIDER NOTE - COVID-19 ORDERING FACILITY
[facility]  [facility]

## 2021-01-07 NOTE — ED PROVIDER NOTE - OBJECTIVE STATEMENT
36 y/o F pt with no pmhx currently 27 weeks pregnant presentig today with c/o pleureitic chest pain onset today. Pt tested covid+ 18 days ago. Has had increasing sob for the past several days but today woke up with a pleuretic chest pain. Pt called pcp's office, and was advised to come to the ED for r/o PE. Pt was seen and cleared by OB, but sent to ED for PE evaluation. Pt denies any n/v/d, abdominal pain, dysuria, congestion, sore throat, neck pain, back pain, weakness, numbness, tingling, dizziness, syncope, or other complaint.

## 2021-01-07 NOTE — ED PROVIDER NOTE - ATTENDING CONTRIBUTION TO CARE
35 YOF no pmhx , 27 weeks pregnant sent from OB for eval for PE. Reports tested positive for COVID19 2.5 weeks ago here with pleuritic chest pain x 1 day. Called PCP, who recommended she get evaluated. Denies dvt/pe history.   AP - no hypoxia or tachycardia. FHR 145s, already evaluated by OB. discussed with patient will get ddimer and reassess. bilateral duplex

## 2021-01-07 NOTE — OB RN TRIAGE NOTE - NS_TRIAGEADDITIONAL COMMENTS_OBGYN_ALL_OB_FT
Reactive NST No contractions noted, O2 sat 97%. Pt discharged from labor and delivery and transferred to ER via wheelchair. Report called to ER attending by Dr Hernández

## 2021-01-11 LAB
SARS-COV-2 IGG SERPL IA-ACNC: 0.4 RATIO — SIGNIFICANT CHANGE UP
SARS-COV-2 IGG SERPL QL IA: NEGATIVE — SIGNIFICANT CHANGE UP
SARS-COV-2 IGG SERPL QL IA: NEGATIVE — SIGNIFICANT CHANGE UP
SARS-COV-2 IGM SERPL IA-ACNC: <0.2 RATIO — SIGNIFICANT CHANGE UP

## 2021-01-13 ENCOUNTER — APPOINTMENT (OUTPATIENT)
Dept: OBGYN | Facility: CLINIC | Age: 36
End: 2021-01-13
Payer: COMMERCIAL

## 2021-01-13 PROBLEM — Z78.9 OTHER SPECIFIED HEALTH STATUS: Chronic | Status: ACTIVE | Noted: 2021-01-07

## 2021-01-13 PROCEDURE — ZZZZZ: CPT

## 2021-01-19 ENCOUNTER — TRANSCRIPTION ENCOUNTER (OUTPATIENT)
Age: 36
End: 2021-01-19

## 2021-01-25 ENCOUNTER — APPOINTMENT (OUTPATIENT)
Dept: OBGYN | Facility: CLINIC | Age: 36
End: 2021-01-25
Payer: COMMERCIAL

## 2021-01-25 VITALS
BODY MASS INDEX: 29.02 KG/M2 | RESPIRATION RATE: 6 BRPM | WEIGHT: 170 LBS | HEIGHT: 64 IN | TEMPERATURE: 97.6 F | HEART RATE: 72 BPM

## 2021-01-25 PROCEDURE — 0500F INITIAL PRENATAL CARE VISIT: CPT

## 2021-01-27 ENCOUNTER — NON-APPOINTMENT (OUTPATIENT)
Age: 36
End: 2021-01-27

## 2021-01-27 LAB
BILIRUB UR QL STRIP: NEGATIVE
CLARITY UR: CLEAR
COLLECTION METHOD: NORMAL
GLUCOSE UR-MCNC: NEGATIVE
HCG UR QL: 0.2 EU/DL
HGB UR QL STRIP.AUTO: NEGATIVE
KETONES UR-MCNC: NEGATIVE
NITRITE UR QL STRIP: NORMAL
PH UR STRIP: 6.5
PROT UR STRIP-MCNC: NEGATIVE
SP GR UR STRIP: 1.01

## 2021-02-03 ENCOUNTER — APPOINTMENT (OUTPATIENT)
Dept: ANTEPARTUM | Facility: CLINIC | Age: 36
End: 2021-02-03
Payer: COMMERCIAL

## 2021-02-03 ENCOUNTER — ASOB RESULT (OUTPATIENT)
Age: 36
End: 2021-02-03

## 2021-02-03 PROCEDURE — 99072 ADDL SUPL MATRL&STAF TM PHE: CPT

## 2021-02-03 PROCEDURE — 76816 OB US FOLLOW-UP PER FETUS: CPT

## 2021-02-08 ENCOUNTER — NON-APPOINTMENT (OUTPATIENT)
Age: 36
End: 2021-02-08

## 2021-02-09 ENCOUNTER — NON-APPOINTMENT (OUTPATIENT)
Age: 36
End: 2021-02-09

## 2021-02-10 ENCOUNTER — NON-APPOINTMENT (OUTPATIENT)
Age: 36
End: 2021-02-10

## 2021-02-10 ENCOUNTER — APPOINTMENT (OUTPATIENT)
Dept: OBGYN | Facility: CLINIC | Age: 36
End: 2021-02-10
Payer: COMMERCIAL

## 2021-02-10 VITALS
TEMPERATURE: 98.6 F | SYSTOLIC BLOOD PRESSURE: 112 MMHG | HEIGHT: 64 IN | RESPIRATION RATE: 16 BRPM | DIASTOLIC BLOOD PRESSURE: 62 MMHG | WEIGHT: 173 LBS | BODY MASS INDEX: 29.53 KG/M2

## 2021-02-10 LAB
BILIRUB UR QL STRIP: NORMAL
CLARITY UR: CLEAR
COLLECTION METHOD: NORMAL
GLUCOSE UR-MCNC: NORMAL
HCG UR QL: 0.2 EU/DL
HGB UR QL STRIP.AUTO: NORMAL
KETONES UR-MCNC: NORMAL
LEUKOCYTE ESTERASE UR QL STRIP: NORMAL
NITRITE UR QL STRIP: NORMAL
PH UR STRIP: 8.5
PROT UR STRIP-MCNC: NORMAL
SP GR UR STRIP: 1.02

## 2021-02-10 PROCEDURE — 0502F SUBSEQUENT PRENATAL CARE: CPT

## 2021-02-10 PROCEDURE — 90715 TDAP VACCINE 7 YRS/> IM: CPT

## 2021-02-10 PROCEDURE — 90471 IMMUNIZATION ADMIN: CPT

## 2021-02-10 RX ORDER — CHLORHEXIDINE GLUCONATE 4 %
325 (65 FE) LIQUID (ML) TOPICAL DAILY
Qty: 90 | Refills: 2 | Status: ACTIVE | COMMUNITY
Start: 2021-02-10 | End: 1900-01-01

## 2021-02-10 RX ORDER — DOCUSATE SODIUM 100 MG/1
100 CAPSULE ORAL TWICE DAILY
Qty: 90 | Refills: 2 | Status: ACTIVE | COMMUNITY
Start: 2021-02-10 | End: 1900-01-01

## 2021-02-24 ENCOUNTER — APPOINTMENT (OUTPATIENT)
Dept: OBGYN | Facility: CLINIC | Age: 36
End: 2021-02-24
Payer: COMMERCIAL

## 2021-02-24 ENCOUNTER — NON-APPOINTMENT (OUTPATIENT)
Age: 36
End: 2021-02-24

## 2021-02-24 VITALS
DIASTOLIC BLOOD PRESSURE: 58 MMHG | TEMPERATURE: 97.8 F | HEART RATE: 72 BPM | BODY MASS INDEX: 29.87 KG/M2 | WEIGHT: 174 LBS | SYSTOLIC BLOOD PRESSURE: 115 MMHG

## 2021-02-24 LAB
BILIRUB UR QL STRIP: NORMAL
GLUCOSE UR-MCNC: NORMAL
HCG UR QL: 0.2 EU/DL
HGB UR QL STRIP.AUTO: NORMAL
KETONES UR-MCNC: NORMAL
LEUKOCYTE ESTERASE UR QL STRIP: NORMAL
NITRITE UR QL STRIP: NORMAL
PH UR STRIP: 7
PROT UR STRIP-MCNC: NORMAL
SP GR UR STRIP: 1.01

## 2021-02-24 PROCEDURE — 0502F SUBSEQUENT PRENATAL CARE: CPT

## 2021-03-08 ENCOUNTER — NON-APPOINTMENT (OUTPATIENT)
Age: 36
End: 2021-03-08

## 2021-03-08 ENCOUNTER — APPOINTMENT (OUTPATIENT)
Dept: OBGYN | Facility: CLINIC | Age: 36
End: 2021-03-08
Payer: COMMERCIAL

## 2021-03-08 VITALS
SYSTOLIC BLOOD PRESSURE: 102 MMHG | DIASTOLIC BLOOD PRESSURE: 60 MMHG | TEMPERATURE: 97.7 F | HEIGHT: 64 IN | BODY MASS INDEX: 29.53 KG/M2 | WEIGHT: 173 LBS | RESPIRATION RATE: 16 BRPM

## 2021-03-08 LAB
BILIRUB UR QL STRIP: NORMAL
CLARITY UR: CLEAR
COLLECTION METHOD: NORMAL
GLUCOSE UR-MCNC: 100
HCG UR QL: 0.2 EU/DL
HGB UR QL STRIP.AUTO: NORMAL
KETONES UR-MCNC: NORMAL
LEUKOCYTE ESTERASE UR QL STRIP: NORMAL
NITRITE UR QL STRIP: NORMAL
PH UR STRIP: 6.5
PROT UR STRIP-MCNC: NORMAL
SP GR UR STRIP: 1.02

## 2021-03-08 PROCEDURE — 0502F SUBSEQUENT PRENATAL CARE: CPT

## 2021-03-09 ENCOUNTER — NON-APPOINTMENT (OUTPATIENT)
Age: 36
End: 2021-03-09

## 2021-03-10 ENCOUNTER — APPOINTMENT (OUTPATIENT)
Dept: DERMATOLOGY | Facility: CLINIC | Age: 36
End: 2021-03-10

## 2021-03-12 ENCOUNTER — TRANSCRIPTION ENCOUNTER (OUTPATIENT)
Age: 36
End: 2021-03-12

## 2021-03-12 ENCOUNTER — NON-APPOINTMENT (OUTPATIENT)
Age: 36
End: 2021-03-12

## 2021-03-17 ENCOUNTER — APPOINTMENT (OUTPATIENT)
Dept: ANTEPARTUM | Facility: CLINIC | Age: 36
End: 2021-03-17

## 2021-03-19 ENCOUNTER — APPOINTMENT (OUTPATIENT)
Dept: ANTEPARTUM | Facility: CLINIC | Age: 36
End: 2021-03-19

## 2021-03-22 ENCOUNTER — NON-APPOINTMENT (OUTPATIENT)
Age: 36
End: 2021-03-22

## 2021-03-25 ENCOUNTER — NON-APPOINTMENT (OUTPATIENT)
Age: 36
End: 2021-03-25

## 2021-03-26 ENCOUNTER — NON-APPOINTMENT (OUTPATIENT)
Age: 36
End: 2021-03-26

## 2021-03-26 ENCOUNTER — APPOINTMENT (OUTPATIENT)
Dept: OBGYN | Facility: CLINIC | Age: 36
End: 2021-03-26
Payer: COMMERCIAL

## 2021-03-26 VITALS
DIASTOLIC BLOOD PRESSURE: 58 MMHG | BODY MASS INDEX: 30.39 KG/M2 | WEIGHT: 178 LBS | SYSTOLIC BLOOD PRESSURE: 104 MMHG | RESPIRATION RATE: 16 BRPM | TEMPERATURE: 97.9 F | HEIGHT: 64 IN

## 2021-03-26 LAB
BILIRUB UR QL STRIP: NORMAL
CLARITY UR: CLEAR
COLLECTION METHOD: NORMAL
GLUCOSE UR-MCNC: NORMAL
HCG UR QL: 0.2 EU/DL
HGB UR QL STRIP.AUTO: NORMAL
KETONES UR-MCNC: NORMAL
LEUKOCYTE ESTERASE UR QL STRIP: NORMAL
NITRITE UR QL STRIP: NORMAL
PH UR STRIP: 7
PROT UR STRIP-MCNC: NORMAL
SP GR UR STRIP: 1.02

## 2021-03-26 PROCEDURE — 0502F SUBSEQUENT PRENATAL CARE: CPT

## 2021-03-30 ENCOUNTER — NON-APPOINTMENT (OUTPATIENT)
Age: 36
End: 2021-03-30

## 2021-03-30 LAB
GP B STREP DNA SPEC QL NAA+PROBE: NORMAL
GP B STREP DNA SPEC QL NAA+PROBE: NOT DETECTED
SOURCE GBS: NORMAL

## 2021-04-01 ENCOUNTER — NON-APPOINTMENT (OUTPATIENT)
Age: 36
End: 2021-04-01

## 2021-04-01 ENCOUNTER — APPOINTMENT (OUTPATIENT)
Dept: OBGYN | Facility: CLINIC | Age: 36
End: 2021-04-01
Payer: COMMERCIAL

## 2021-04-01 VITALS — RESPIRATION RATE: 16 BRPM | HEART RATE: 70 BPM

## 2021-04-01 LAB
BILIRUB UR QL STRIP: NORMAL
CLARITY UR: CLEAR
COLLECTION METHOD: NORMAL
GLUCOSE UR-MCNC: NORMAL
HCG UR QL: 0.2 EU/DL
HGB UR QL STRIP.AUTO: NORMAL
KETONES UR-MCNC: NORMAL
LEUKOCYTE ESTERASE UR QL STRIP: NORMAL
NITRITE UR QL STRIP: NORMAL
PH UR STRIP: 7.5
PROT UR STRIP-MCNC: NORMAL
SP GR UR STRIP: 1.01

## 2021-04-01 PROCEDURE — 0502F SUBSEQUENT PRENATAL CARE: CPT

## 2021-04-01 PROCEDURE — 81003 URINALYSIS AUTO W/O SCOPE: CPT | Mod: QW

## 2021-04-08 ENCOUNTER — NON-APPOINTMENT (OUTPATIENT)
Age: 36
End: 2021-04-08

## 2021-04-08 ENCOUNTER — APPOINTMENT (OUTPATIENT)
Dept: OBGYN | Facility: CLINIC | Age: 36
End: 2021-04-08
Payer: COMMERCIAL

## 2021-04-08 VITALS
HEIGHT: 64 IN | SYSTOLIC BLOOD PRESSURE: 120 MMHG | HEART RATE: 78 BPM | TEMPERATURE: 98 F | WEIGHT: 180 LBS | BODY MASS INDEX: 30.73 KG/M2 | RESPIRATION RATE: 15 BRPM | DIASTOLIC BLOOD PRESSURE: 70 MMHG

## 2021-04-08 LAB
BILIRUB UR QL STRIP: NEGATIVE
CLARITY UR: NORMAL
COLLECTION METHOD: NORMAL
GLUCOSE UR-MCNC: NEGATIVE
HCG UR QL: 0.2 EU/DL
HGB UR QL STRIP.AUTO: NEGATIVE
KETONES UR-MCNC: NEGATIVE
LEUKOCYTE ESTERASE UR QL STRIP: NORMAL
NITRITE UR QL STRIP: NEGATIVE
PH UR STRIP: 7
PROT UR STRIP-MCNC: NEGATIVE
SP GR UR STRIP: 1.02

## 2021-04-08 PROCEDURE — 0502F SUBSEQUENT PRENATAL CARE: CPT

## 2021-04-13 ENCOUNTER — NON-APPOINTMENT (OUTPATIENT)
Age: 36
End: 2021-04-13

## 2021-04-13 ENCOUNTER — ASOB RESULT (OUTPATIENT)
Age: 36
End: 2021-04-13

## 2021-04-13 ENCOUNTER — APPOINTMENT (OUTPATIENT)
Dept: ANTEPARTUM | Facility: CLINIC | Age: 36
End: 2021-04-13
Payer: COMMERCIAL

## 2021-04-13 ENCOUNTER — INPATIENT (INPATIENT)
Facility: HOSPITAL | Age: 36
LOS: 2 days | Discharge: HOME | End: 2021-04-16
Attending: OBSTETRICS & GYNECOLOGY | Admitting: OBSTETRICS & GYNECOLOGY
Payer: COMMERCIAL

## 2021-04-13 ENCOUNTER — RESULT REVIEW (OUTPATIENT)
Age: 36
End: 2021-04-13

## 2021-04-13 VITALS — WEIGHT: 180.78 LBS

## 2021-04-13 DIAGNOSIS — O26.899 OTHER SPECIFIED PREGNANCY RELATED CONDITIONS, UNSPECIFIED TRIMESTER: ICD-10-CM

## 2021-04-13 LAB
AMNISURE ROM (RUPTURE OF MEMBRANES): NEGATIVE — SIGNIFICANT CHANGE UP
BASOPHILS # BLD AUTO: 0.04 K/UL — SIGNIFICANT CHANGE UP (ref 0–0.2)
BASOPHILS NFR BLD AUTO: 0.4 % — SIGNIFICANT CHANGE UP (ref 0–2)
COVID-19 SPIKE DOMAIN AB INTERP: POSITIVE
COVID-19 SPIKE DOMAIN ANTIBODY RESULT: 6.36 U/ML — HIGH
EOSINOPHIL # BLD AUTO: 0.03 K/UL — SIGNIFICANT CHANGE UP (ref 0–0.5)
EOSINOPHIL NFR BLD AUTO: 0.3 % — SIGNIFICANT CHANGE UP (ref 0–6)
HCT VFR BLD CALC: 40.2 % — SIGNIFICANT CHANGE UP (ref 34.5–45)
HGB BLD-MCNC: 13.9 G/DL — SIGNIFICANT CHANGE UP (ref 11.5–15.5)
IMM GRANULOCYTES NFR BLD AUTO: 1 % — SIGNIFICANT CHANGE UP (ref 0–1.5)
LYMPHOCYTES # BLD AUTO: 1.76 K/UL — SIGNIFICANT CHANGE UP (ref 1–3.3)
LYMPHOCYTES # BLD AUTO: 16 % — SIGNIFICANT CHANGE UP (ref 13–44)
MCHC RBC-ENTMCNC: 33.3 PG — SIGNIFICANT CHANGE UP (ref 27–34)
MCHC RBC-ENTMCNC: 34.6 GM/DL — SIGNIFICANT CHANGE UP (ref 32–36)
MCV RBC AUTO: 96.2 FL — SIGNIFICANT CHANGE UP (ref 80–100)
MONOCYTES # BLD AUTO: 0.95 K/UL — HIGH (ref 0–0.9)
MONOCYTES NFR BLD AUTO: 8.6 % — SIGNIFICANT CHANGE UP (ref 2–14)
NEUTROPHILS # BLD AUTO: 8.1 K/UL — HIGH (ref 1.8–7.4)
NEUTROPHILS NFR BLD AUTO: 73.7 % — SIGNIFICANT CHANGE UP (ref 43–77)
PLATELET # BLD AUTO: 254 K/UL — SIGNIFICANT CHANGE UP (ref 150–400)
RBC # BLD: 4.18 M/UL — SIGNIFICANT CHANGE UP (ref 3.8–5.2)
RBC # FLD: 12.2 % — SIGNIFICANT CHANGE UP (ref 10.3–14.5)
SARS-COV-2 IGG+IGM SERPL QL IA: 6.36 U/ML — HIGH
SARS-COV-2 IGG+IGM SERPL QL IA: POSITIVE
SARS-COV-2 RNA SPEC QL NAA+PROBE: DETECTED
T PALLIDUM AB TITR SER: NEGATIVE — SIGNIFICANT CHANGE UP
WBC # BLD: 10.99 K/UL — HIGH (ref 3.8–10.5)
WBC # FLD AUTO: 10.99 K/UL — HIGH (ref 3.8–10.5)

## 2021-04-13 PROCEDURE — U0003: CPT

## 2021-04-13 PROCEDURE — 59514 CESAREAN DELIVERY ONLY: CPT | Mod: AS

## 2021-04-13 PROCEDURE — 76819 FETAL BIOPHYS PROFIL W/O NST: CPT

## 2021-04-13 PROCEDURE — 88307 TISSUE EXAM BY PATHOLOGIST: CPT

## 2021-04-13 PROCEDURE — 59050 FETAL MONITOR W/REPORT: CPT

## 2021-04-13 PROCEDURE — 86850 RBC ANTIBODY SCREEN: CPT

## 2021-04-13 PROCEDURE — 76820 UMBILICAL ARTERY ECHO: CPT

## 2021-04-13 PROCEDURE — 86769 SARS-COV-2 COVID-19 ANTIBODY: CPT

## 2021-04-13 PROCEDURE — 86900 BLOOD TYPING SEROLOGIC ABO: CPT

## 2021-04-13 PROCEDURE — 86780 TREPONEMA PALLIDUM: CPT

## 2021-04-13 PROCEDURE — 86901 BLOOD TYPING SEROLOGIC RH(D): CPT

## 2021-04-13 PROCEDURE — U0005: CPT

## 2021-04-13 PROCEDURE — 36415 COLL VENOUS BLD VENIPUNCTURE: CPT

## 2021-04-13 PROCEDURE — 85025 COMPLETE CBC W/AUTO DIFF WBC: CPT

## 2021-04-13 PROCEDURE — 76816 OB US FOLLOW-UP PER FETUS: CPT

## 2021-04-13 PROCEDURE — 84112 EVAL AMNIOTIC FLUID PROTEIN: CPT

## 2021-04-13 PROCEDURE — 88307 TISSUE EXAM BY PATHOLOGIST: CPT | Mod: 26

## 2021-04-13 PROCEDURE — 85027 COMPLETE CBC AUTOMATED: CPT

## 2021-04-13 PROCEDURE — 99072 ADDL SUPL MATRL&STAF TM PHE: CPT

## 2021-04-13 PROCEDURE — 94760 N-INVAS EAR/PLS OXIMETRY 1: CPT

## 2021-04-13 RX ORDER — OXYTOCIN 10 UNIT/ML
333.33 VIAL (ML) INJECTION
Qty: 20 | Refills: 0 | Status: DISCONTINUED | OUTPATIENT
Start: 2021-04-13 | End: 2021-04-16

## 2021-04-13 RX ORDER — SODIUM CHLORIDE 9 MG/ML
1000 INJECTION, SOLUTION INTRAVENOUS
Refills: 0 | Status: DISCONTINUED | OUTPATIENT
Start: 2021-04-13 | End: 2021-04-14

## 2021-04-13 RX ORDER — OXYCODONE HYDROCHLORIDE 5 MG/1
5 TABLET ORAL
Refills: 0 | Status: DISCONTINUED | OUTPATIENT
Start: 2021-04-13 | End: 2021-04-16

## 2021-04-13 RX ORDER — SODIUM CHLORIDE 9 MG/ML
1000 INJECTION, SOLUTION INTRAVENOUS
Refills: 0 | Status: DISCONTINUED | OUTPATIENT
Start: 2021-04-13 | End: 2021-04-16

## 2021-04-13 RX ORDER — MAGNESIUM HYDROXIDE 400 MG/1
30 TABLET, CHEWABLE ORAL
Refills: 0 | Status: DISCONTINUED | OUTPATIENT
Start: 2021-04-13 | End: 2021-04-16

## 2021-04-13 RX ORDER — NALOXONE HYDROCHLORIDE 4 MG/.1ML
0.1 SPRAY NASAL
Refills: 0 | Status: DISCONTINUED | OUTPATIENT
Start: 2021-04-13 | End: 2021-04-16

## 2021-04-13 RX ORDER — DINOPROSTONE 10 MG/241MG
10 INSERT VAGINAL ONCE
Refills: 0 | Status: COMPLETED | OUTPATIENT
Start: 2021-04-13 | End: 2021-04-13

## 2021-04-13 RX ORDER — AZITHROMYCIN 500 MG/1
500 TABLET, FILM COATED ORAL ONCE
Refills: 0 | Status: COMPLETED | OUTPATIENT
Start: 2021-04-13 | End: 2021-04-13

## 2021-04-13 RX ORDER — OXYTOCIN 10 UNIT/ML
2 VIAL (ML) INJECTION
Qty: 30 | Refills: 0 | Status: DISCONTINUED | OUTPATIENT
Start: 2021-04-13 | End: 2021-04-16

## 2021-04-13 RX ORDER — ACETAMINOPHEN 500 MG
975 TABLET ORAL
Refills: 0 | Status: DISCONTINUED | OUTPATIENT
Start: 2021-04-13 | End: 2021-04-16

## 2021-04-13 RX ORDER — IBUPROFEN 200 MG
600 TABLET ORAL EVERY 6 HOURS
Refills: 0 | Status: COMPLETED | OUTPATIENT
Start: 2021-04-13 | End: 2022-03-12

## 2021-04-13 RX ORDER — DIPHENHYDRAMINE HCL 50 MG
25 CAPSULE ORAL EVERY 6 HOURS
Refills: 0 | Status: DISCONTINUED | OUTPATIENT
Start: 2021-04-13 | End: 2021-04-16

## 2021-04-13 RX ORDER — OXYCODONE HYDROCHLORIDE 5 MG/1
10 TABLET ORAL
Refills: 0 | Status: DISCONTINUED | OUTPATIENT
Start: 2021-04-13 | End: 2021-04-16

## 2021-04-13 RX ORDER — ACETAMINOPHEN 500 MG
1000 TABLET ORAL ONCE
Refills: 0 | Status: COMPLETED | OUTPATIENT
Start: 2021-04-13 | End: 2021-04-13

## 2021-04-13 RX ORDER — ONDANSETRON 8 MG/1
4 TABLET, FILM COATED ORAL EVERY 6 HOURS
Refills: 0 | Status: DISCONTINUED | OUTPATIENT
Start: 2021-04-13 | End: 2021-04-16

## 2021-04-13 RX ORDER — CITRIC ACID/SODIUM CITRATE 300-500 MG
30 SOLUTION, ORAL ORAL ONCE
Refills: 0 | Status: COMPLETED | OUTPATIENT
Start: 2021-04-13 | End: 2021-04-13

## 2021-04-13 RX ORDER — TETANUS TOXOID, REDUCED DIPHTHERIA TOXOID AND ACELLULAR PERTUSSIS VACCINE, ADSORBED 5; 2.5; 8; 8; 2.5 [IU]/.5ML; [IU]/.5ML; UG/.5ML; UG/.5ML; UG/.5ML
0.5 SUSPENSION INTRAMUSCULAR ONCE
Refills: 0 | Status: DISCONTINUED | OUTPATIENT
Start: 2021-04-13 | End: 2021-04-16

## 2021-04-13 RX ORDER — MORPHINE SULFATE 50 MG/1
0.1 CAPSULE, EXTENDED RELEASE ORAL ONCE
Refills: 0 | Status: DISCONTINUED | OUTPATIENT
Start: 2021-04-13 | End: 2021-04-16

## 2021-04-13 RX ORDER — HYDROMORPHONE HYDROCHLORIDE 2 MG/ML
0.5 INJECTION INTRAMUSCULAR; INTRAVENOUS; SUBCUTANEOUS
Refills: 0 | Status: DISCONTINUED | OUTPATIENT
Start: 2021-04-13 | End: 2021-04-13

## 2021-04-13 RX ORDER — LANOLIN
1 OINTMENT (GRAM) TOPICAL EVERY 6 HOURS
Refills: 0 | Status: DISCONTINUED | OUTPATIENT
Start: 2021-04-13 | End: 2021-04-16

## 2021-04-13 RX ORDER — SIMETHICONE 80 MG/1
80 TABLET, CHEWABLE ORAL EVERY 4 HOURS
Refills: 0 | Status: DISCONTINUED | OUTPATIENT
Start: 2021-04-13 | End: 2021-04-16

## 2021-04-13 RX ORDER — ENOXAPARIN SODIUM 100 MG/ML
40 INJECTION SUBCUTANEOUS DAILY
Refills: 0 | Status: DISCONTINUED | OUTPATIENT
Start: 2021-04-13 | End: 2021-04-16

## 2021-04-13 RX ADMIN — Medication 30 MILLILITER(S): at 19:43

## 2021-04-13 RX ADMIN — SODIUM CHLORIDE 125 MILLILITER(S): 9 INJECTION, SOLUTION INTRAVENOUS at 18:47

## 2021-04-13 RX ADMIN — Medication 1000 MILLIUNIT(S)/MIN: at 22:40

## 2021-04-13 RX ADMIN — Medication 400 MILLIGRAM(S): at 23:38

## 2021-04-13 RX ADMIN — AZITHROMYCIN 255 MILLIGRAM(S): 500 TABLET, FILM COATED ORAL at 19:43

## 2021-04-13 RX ADMIN — SODIUM CHLORIDE 125 MILLILITER(S): 9 INJECTION, SOLUTION INTRAVENOUS at 16:22

## 2021-04-13 RX ADMIN — Medication 1000 MILLIGRAM(S): at 23:45

## 2021-04-13 NOTE — PATIENT PROFILE OB - PRO PRENATAL LABS ORI SOURCE HIV
"Patient presents with cough. States that lungs burn and that she has laryngitis, a sore throat. 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   Medication Reconciliation: leticia Colindres LPN  3/23/2020 11:07 AM  "
hard copy, drawn during this pregnancy

## 2021-04-14 ENCOUNTER — APPOINTMENT (OUTPATIENT)
Dept: OBGYN | Facility: CLINIC | Age: 36
End: 2021-04-14

## 2021-04-14 LAB
BASOPHILS # BLD AUTO: 0.04 K/UL — SIGNIFICANT CHANGE UP (ref 0–0.2)
BASOPHILS NFR BLD AUTO: 0.2 % — SIGNIFICANT CHANGE UP (ref 0–2)
COVID-19 SPIKE DOMAIN AB INTERP: POSITIVE
COVID-19 SPIKE DOMAIN ANTIBODY RESULT: 5.69 U/ML — HIGH
EOSINOPHIL # BLD AUTO: 0 K/UL — SIGNIFICANT CHANGE UP (ref 0–0.5)
EOSINOPHIL NFR BLD AUTO: 0 % — SIGNIFICANT CHANGE UP (ref 0–6)
HCT VFR BLD CALC: 35.5 % — SIGNIFICANT CHANGE UP (ref 34.5–45)
HGB BLD-MCNC: 12.1 G/DL — SIGNIFICANT CHANGE UP (ref 11.5–15.5)
IMM GRANULOCYTES NFR BLD AUTO: 0.9 % — SIGNIFICANT CHANGE UP (ref 0–1.5)
LYMPHOCYTES # BLD AUTO: 1.27 K/UL — SIGNIFICANT CHANGE UP (ref 1–3.3)
LYMPHOCYTES # BLD AUTO: 5.7 % — LOW (ref 13–44)
MCHC RBC-ENTMCNC: 33.2 PG — SIGNIFICANT CHANGE UP (ref 27–34)
MCHC RBC-ENTMCNC: 34.1 GM/DL — SIGNIFICANT CHANGE UP (ref 32–36)
MCV RBC AUTO: 97.5 FL — SIGNIFICANT CHANGE UP (ref 80–100)
MONOCYTES # BLD AUTO: 1.39 K/UL — HIGH (ref 0–0.9)
MONOCYTES NFR BLD AUTO: 6.2 % — SIGNIFICANT CHANGE UP (ref 2–14)
NEUTROPHILS # BLD AUTO: 19.5 K/UL — HIGH (ref 1.8–7.4)
NEUTROPHILS NFR BLD AUTO: 87 % — HIGH (ref 43–77)
PLATELET # BLD AUTO: 226 K/UL — SIGNIFICANT CHANGE UP (ref 150–400)
RBC # BLD: 3.64 M/UL — LOW (ref 3.8–5.2)
RBC # FLD: 12.2 % — SIGNIFICANT CHANGE UP (ref 10.3–14.5)
SARS-COV-2 IGG+IGM SERPL QL IA: 5.69 U/ML — HIGH
SARS-COV-2 IGG+IGM SERPL QL IA: POSITIVE
WBC # BLD: 22.4 K/UL — HIGH (ref 3.8–10.5)
WBC # FLD AUTO: 22.4 K/UL — HIGH (ref 3.8–10.5)

## 2021-04-14 RX ORDER — IBUPROFEN 200 MG
600 TABLET ORAL EVERY 6 HOURS
Refills: 0 | Status: DISCONTINUED | OUTPATIENT
Start: 2021-04-14 | End: 2021-04-16

## 2021-04-14 RX ADMIN — Medication 600 MILLIGRAM(S): at 11:00

## 2021-04-14 RX ADMIN — Medication 975 MILLIGRAM(S): at 15:15

## 2021-04-14 RX ADMIN — Medication 600 MILLIGRAM(S): at 17:43

## 2021-04-14 RX ADMIN — Medication 975 MILLIGRAM(S): at 09:16

## 2021-04-14 RX ADMIN — Medication 975 MILLIGRAM(S): at 22:17

## 2021-04-14 RX ADMIN — Medication 600 MILLIGRAM(S): at 11:54

## 2021-04-14 RX ADMIN — Medication 975 MILLIGRAM(S): at 10:00

## 2021-04-14 RX ADMIN — Medication 600 MILLIGRAM(S): at 05:00

## 2021-04-14 RX ADMIN — Medication 975 MILLIGRAM(S): at 16:00

## 2021-04-14 RX ADMIN — Medication 600 MILLIGRAM(S): at 05:30

## 2021-04-14 RX ADMIN — ENOXAPARIN SODIUM 40 MILLIGRAM(S): 100 INJECTION SUBCUTANEOUS at 09:17

## 2021-04-14 NOTE — PROGRESS NOTE ADULT - SUBJECTIVE AND OBJECTIVE BOX
POD #1    Pt is a 36yo P1 s/p primary c/s r/t NRFHT prior to induction initiation. Pt is doing well, pain is well controlled. Catheter discontinued this morning. Will ambulate this morning. Has not passed gas. Breastfeeding, no difficulty per patient, baby alert today.    PNC with [facility]  ICU Vital Signs Last 24 Hrs  T(C): 36.7 (14 Apr 2021 09:17), Max: 36.7 (14 Apr 2021 09:17)  T(F): 98 (14 Apr 2021 09:17), Max: 98 (14 Apr 2021 09:17)  HR: 84 (14 Apr 2021 09:17) (61 - 84)  BP: 108/64 (14 Apr 2021 09:17) (108/64 - 135/63)  BP(mean): 76 (14 Apr 2021 05:12) (76 - 82)  RR: 16 (14 Apr 2021 09:17) (16 - 16)  SpO2: 98% (14 Apr 2021 09:17) (96% - 100%)                        12.1   22.40 )-----------( 226      ( 14 Apr 2021 06:29 )             35.5     Breasts: soft nontender, lactating, no masses or red streaks  Heart: RRR  Lungs: CTAB  Abdomen: bowel sounds x 4 quadrants, mild distension, fundus at umbilicus, dressing clean dry and intact  Alice: mod lochia  Extremities: nontender, venodynes on

## 2021-04-15 RX ADMIN — Medication 600 MILLIGRAM(S): at 06:54

## 2021-04-15 RX ADMIN — Medication 975 MILLIGRAM(S): at 22:15

## 2021-04-15 RX ADMIN — Medication 975 MILLIGRAM(S): at 09:12

## 2021-04-15 RX ADMIN — Medication 600 MILLIGRAM(S): at 12:41

## 2021-04-15 RX ADMIN — ENOXAPARIN SODIUM 40 MILLIGRAM(S): 100 INJECTION SUBCUTANEOUS at 09:12

## 2021-04-15 RX ADMIN — MAGNESIUM HYDROXIDE 30 MILLILITER(S): 400 TABLET, CHEWABLE ORAL at 12:41

## 2021-04-15 RX ADMIN — Medication 975 MILLIGRAM(S): at 03:02

## 2021-04-15 RX ADMIN — Medication 975 MILLIGRAM(S): at 21:26

## 2021-04-15 RX ADMIN — Medication 600 MILLIGRAM(S): at 00:29

## 2021-04-15 RX ADMIN — Medication 600 MILLIGRAM(S): at 23:40

## 2021-04-15 RX ADMIN — SIMETHICONE 80 MILLIGRAM(S): 80 TABLET, CHEWABLE ORAL at 21:26

## 2021-04-15 RX ADMIN — Medication 975 MILLIGRAM(S): at 16:11

## 2021-04-15 RX ADMIN — Medication 600 MILLIGRAM(S): at 17:40

## 2021-04-15 NOTE — PROGRESS NOTE ADULT - PROBLEM SELECTOR PLAN 1
Breastfeeding on demand  CBC stable  Encourage ambulation to promote flatus  Circumcision today- MD Kade Whitlock informed of need for circumcision- to be done today or tomorrow as census allows  Discharge on postpartum day #3

## 2021-04-15 NOTE — PROGRESS NOTE ADULT - ASSESSMENT
36yo P1POD #1, s/p primary  for NRFHT on admission  Encourage ambulation  Routine postpartum care  Keep incision clean and dry  Exclusive breastfeeding/lactation consult  Will consult MD-1 for desired circumcision for baby
Assessment and Plan  POD #2 s/p primary c/section  Doing well.  Breastfeeding on demand  CBC stable  Encourage ambulation to promote flatus  Circumcision today- MD 1 Luks informed of need for circumcision- to be done today or tomorrow as census allows

## 2021-04-15 NOTE — PROGRESS NOTE ADULT - SUBJECTIVE AND OBJECTIVE BOX
Postpartum Note,  Section  Pt is a  35y female who is now post-operative day: 2  PN care with [facility]  No complications in pregnancy  Sent to L&D due to oligo     Subjective:  POD#2 s/p primary  for non-reassuring fetal heart rate  Seen and evaluated at bedside. Passing small amounts of gas.  No BM yet.   Tolerating a regular diet without nausea or vomiting.   Denies headache, dizziness, chest pain, palpitations, shortness of breath, or heavy vaginal bleeding.    Pt is breastfeeding, ambulating without assistance, and voiding spontaneously (needed reinsertion of urinary cath on pp day #1 but doing well now)  Pain is well controlled.    Reports removing her dressing while in the shower yesterday.    Allergies  cephalosporins (Rash; Urticaria)    Physical exam:  Vital Signs Last 24 Hrs  T(C): 36.6 (15 Apr 2021 04:30), Max: 36.6 (15 Apr 2021 04:30)  T(F): 97.8 (15 Apr 2021 04:30), Max: 97.8 (15 Apr 2021 04:30)  HR: 68 (15 Apr 2021 04:30) (68 - 85)  BP: 116/72 (15 Apr 2021 04:30) (105/62 - 116/72)  RR: 16 (15 Apr 2021 04:30) (16 - 16)  SpO2: 100% (15 Apr 2021 04:30) (98% - 100%)    Gen: NAD  Resp:  even and unlabored  Breast: Soft, nontender, not engorged. Nipples without cracking.   Abdomen: Soft, nontender, no distension , firm uterine fundus at umbilicus.  Incision: Clean, dry, and intact.    Pelvic: Normal lochia noted  Ext: No calf tenderness    LABS:                        12.1   22.40 )-----------( 226      ( 2021 06:29 )             35.5       Blood type: A positive

## 2021-04-16 ENCOUNTER — NON-APPOINTMENT (OUTPATIENT)
Age: 36
End: 2021-04-16

## 2021-04-16 ENCOUNTER — TRANSCRIPTION ENCOUNTER (OUTPATIENT)
Age: 36
End: 2021-04-16

## 2021-04-16 VITALS
RESPIRATION RATE: 18 BRPM | HEART RATE: 81 BPM | DIASTOLIC BLOOD PRESSURE: 74 MMHG | OXYGEN SATURATION: 97 % | SYSTOLIC BLOOD PRESSURE: 130 MMHG | TEMPERATURE: 98 F

## 2021-04-16 LAB
HCT VFR BLD CALC: 33.1 % — LOW (ref 34.5–45)
HGB BLD-MCNC: 10.9 G/DL — LOW (ref 11.5–15.5)
MCHC RBC-ENTMCNC: 32.7 PG — SIGNIFICANT CHANGE UP (ref 27–34)
MCHC RBC-ENTMCNC: 32.9 GM/DL — SIGNIFICANT CHANGE UP (ref 32–36)
MCV RBC AUTO: 99.4 FL — SIGNIFICANT CHANGE UP (ref 80–100)
PLATELET # BLD AUTO: 249 K/UL — SIGNIFICANT CHANGE UP (ref 150–400)
RBC # BLD: 3.33 M/UL — LOW (ref 3.8–5.2)
RBC # FLD: 12.6 % — SIGNIFICANT CHANGE UP (ref 10.3–14.5)
WBC # BLD: 11.2 K/UL — HIGH (ref 3.8–10.5)
WBC # FLD AUTO: 11.2 K/UL — HIGH (ref 3.8–10.5)

## 2021-04-16 RX ORDER — ACETAMINOPHEN 500 MG
3 TABLET ORAL
Qty: 0 | Refills: 0 | DISCHARGE
Start: 2021-04-16

## 2021-04-16 RX ORDER — IBUPROFEN 200 MG
1 TABLET ORAL
Qty: 0 | Refills: 0 | DISCHARGE
Start: 2021-04-16

## 2021-04-16 RX ADMIN — ENOXAPARIN SODIUM 40 MILLIGRAM(S): 100 INJECTION SUBCUTANEOUS at 09:19

## 2021-04-16 RX ADMIN — Medication 975 MILLIGRAM(S): at 04:00

## 2021-04-16 RX ADMIN — Medication 600 MILLIGRAM(S): at 12:08

## 2021-04-16 RX ADMIN — Medication 600 MILLIGRAM(S): at 07:06

## 2021-04-16 RX ADMIN — Medication 975 MILLIGRAM(S): at 09:18

## 2021-04-16 RX ADMIN — Medication 975 MILLIGRAM(S): at 03:16

## 2021-04-16 RX ADMIN — Medication 975 MILLIGRAM(S): at 10:18

## 2021-04-16 RX ADMIN — Medication 600 MILLIGRAM(S): at 00:30

## 2021-04-16 NOTE — DISCHARGE NOTE OB - HOSPITAL COURSE
Pt sent to labor and delivery for IOL r/t oligo. Prior to IOL NRFHT, once better, cervidil placed but continued NRFHT. CST was discussed, pt opted for primary . Uncomplicated postpartum course. Follow up in 2 weeks.

## 2021-04-16 NOTE — DISCHARGE NOTE OB - PLAN OF CARE
Normal transition to postpartum state Keep incision clean and dry. Pain control. Ambulate. Increase PO. No heavy lifting. Nothing pv.

## 2021-04-16 NOTE — DISCHARGE NOTE OB - MEDICATION SUMMARY - MEDICATIONS TO TAKE
I will START or STAY ON the medications listed below when I get home from the hospital:    acetaminophen 325 mg oral tablet  -- 3 tab(s) by mouth   -- Indication: For  delivery delivered    ibuprofen 600 mg oral tablet  -- 1 tab(s) by mouth every 6 hours  -- Indication: For  delivery delivered

## 2021-04-16 NOTE — DISCHARGE NOTE OB - PATIENT PORTAL LINK FT
You can access the FollowMyHealth Patient Portal offered by [facility] by registering at the following website: [email or web address]. By joining [facility] [facility]’s FollowMyHealth portal, you will also be able to view your health information using other applications (apps) compatible with our system.

## 2021-04-16 NOTE — DISCHARGE NOTE OB - CARE PLAN
Principal Discharge DX:	 delivery delivered  Goal:	Normal transition to postpartum state  Assessment and plan of treatment:	Keep incision clean and dry. Pain control. Ambulate. Increase PO. No heavy lifting. Nothing pv.

## 2021-04-16 NOTE — DISCHARGE NOTE OB - CARE PROVIDER_API CALL
Miriam Parks (SONDRA; RN)  Midwifery  [address] [place] [address]  Phone: [phone]  Fax: [phone]  Follow Up Time:

## 2021-04-20 DIAGNOSIS — U07.1 COVID-19: ICD-10-CM

## 2021-04-20 DIAGNOSIS — Z86.011 PERSONAL HISTORY OF BENIGN NEOPLASM OF THE BRAIN: ICD-10-CM

## 2021-04-20 DIAGNOSIS — O41.03X0 OLIGOHYDRAMNIOS, THIRD TRIMESTER, NOT APPLICABLE OR UNSPECIFIED: ICD-10-CM

## 2021-04-20 DIAGNOSIS — J42 UNSPECIFIED CHRONIC BRONCHITIS: ICD-10-CM

## 2021-04-20 DIAGNOSIS — Z3A.40 40 WEEKS GESTATION OF PREGNANCY: ICD-10-CM

## 2021-04-20 DIAGNOSIS — J12.82 PNEUMONIA DUE TO CORONAVIRUS DISEASE 2019: ICD-10-CM

## 2021-04-20 DIAGNOSIS — K58.9 IRRITABLE BOWEL SYNDROME WITHOUT DIARRHEA: ICD-10-CM

## 2021-04-22 ENCOUNTER — NON-APPOINTMENT (OUTPATIENT)
Age: 36
End: 2021-04-22

## 2021-04-26 ENCOUNTER — NON-APPOINTMENT (OUTPATIENT)
Age: 36
End: 2021-04-26

## 2021-04-26 ENCOUNTER — TRANSCRIPTION ENCOUNTER (OUTPATIENT)
Age: 36
End: 2021-04-26

## 2021-04-27 ENCOUNTER — TRANSCRIPTION ENCOUNTER (OUTPATIENT)
Age: 36
End: 2021-04-27

## 2021-04-28 ENCOUNTER — APPOINTMENT (OUTPATIENT)
Dept: OBGYN | Facility: CLINIC | Age: 36
End: 2021-04-28
Payer: COMMERCIAL

## 2021-04-28 VITALS
SYSTOLIC BLOOD PRESSURE: 82 MMHG | TEMPERATURE: 97.7 F | DIASTOLIC BLOOD PRESSURE: 62 MMHG | WEIGHT: 165 LBS | BODY MASS INDEX: 28.32 KG/M2 | HEART RATE: 60 BPM

## 2021-04-28 PROCEDURE — 0503F POSTPARTUM CARE VISIT: CPT

## 2021-04-28 NOTE — HISTORY OF PRESENT ILLNESS
[Postpartum Follow Up] : postpartum follow up [Complications:___] : complications include: [unfilled] [Delivery Date: ___] : on [unfilled] [Primary C/S] : delivered by  section [Male] : Delivery History: baby boy [Wt. ___] : weighing [unfilled] [Breastfeeding] : currently nursing [BF with Difficulty] : nursing with difficulty [Resumed Menses] : has not resumed her menses [Resumed [place]] : has not resumed intercourse [BreastFeeding Problems] : breastfeeding problems [S/Sx PP Depression] : no signs/symptoms of postpartum depression [Heavy Bleeding] : no heavy bleeding [Incisional Drainage] : no incisional drainage [Incisional Pain] : no incisional pain [Irregular Bleeding] : no irregular bleeding [Leg Pain] : no leg pain [Shortness of Breath] : no shortness of breath [Suicidal Ideation] : no suicidal ideation [None] : No associated symptoms are reported [Clean/Dry/Intact] : clean, dry and intact [Healed] : not healed

## 2021-05-27 ENCOUNTER — APPOINTMENT (OUTPATIENT)
Dept: OBGYN | Facility: CLINIC | Age: 36
End: 2021-05-27
Payer: COMMERCIAL

## 2021-05-27 ENCOUNTER — NON-APPOINTMENT (OUTPATIENT)
Age: 36
End: 2021-05-27

## 2021-05-27 VITALS — DIASTOLIC BLOOD PRESSURE: 60 MMHG | SYSTOLIC BLOOD PRESSURE: 110 MMHG | HEIGHT: 64 IN

## 2021-05-27 DIAGNOSIS — O09.33 SUPERVISION OF PREGNANCY WITH INSUFFICIENT ANTENATAL CARE, THIRD TRIMESTER: ICD-10-CM

## 2021-05-27 DIAGNOSIS — Z3A.29 29 WEEKS GESTATION OF PREGNANCY: ICD-10-CM

## 2021-05-27 DIAGNOSIS — O48.0 POST-TERM PREGNANCY: ICD-10-CM

## 2021-05-27 DIAGNOSIS — Z34.03 ENCOUNTER FOR SUPERVISION OF NORMAL FIRST PREGNANCY, THIRD TRIMESTER: ICD-10-CM

## 2021-05-27 PROCEDURE — 0503F POSTPARTUM CARE VISIT: CPT

## 2021-05-27 RX ORDER — NORETHINDRONE 0.35 MG/1
0.35 TABLET ORAL DAILY
Qty: 28 | Refills: 5 | Status: ACTIVE | COMMUNITY
Start: 2021-05-27 | End: 1900-01-01

## 2021-05-28 LAB
CANDIDA VAG CYTO: NOT DETECTED
G VAGINALIS+PREV SP MTYP VAG QL MICRO: NOT DETECTED
T VAGINALIS VAG QL WET PREP: NOT DETECTED

## 2021-05-28 NOTE — HISTORY OF PRESENT ILLNESS
[Delivery Date: ___] : on [unfilled] [Primary C/S] : delivered by  section [Male] : Delivery History: baby boy [Wt. ___] : weighing [unfilled] [Breastfeeding] : currently nursing [BF with Difficulty] : nursing with difficulty [Clean/Dry/Intact] : clean, dry and intact [Back to Normal] : is back to normal in size [None] : no vaginal bleeding [Examination Of The Breasts] : breasts are normal [No Sign of Infection] : is showing no signs of infection [Abdominal Pain] : no abdominal pain [Back Pain] : no back pain [Chest Pain] : no chest pain [Cracked Nipples] : no cracked nipples [S/Sx PP Depression] : no signs/symptoms of postpartum depression [Incisional Pain] : no incisional pain [Irregular Bleeding] : no irregular bleeding [Suicidal Ideation] : no suicidal ideation [Erythema] : not erythematous [Swelling] : not swollen [Dehiscence] : not dehisced [FreeTextEntry8] : Pt presents for 6 week postpartum visit. Doing well, had postpartum blues during the first 2 weeks, doing better now, but thinking about therapy for anxiety. Has appt with lactation consultant today, +breastfeeding but baby has been gaining weight on the slow side. Bleeding stopped 2 weeks ago. Not really having scar pain but sensation feels different, and can feel some pulling.  [de-identified] : Ken- IOL oligo, barely initiated cervidil and had recurrent decels [de-identified] : +anxiety, had pre-existing, but still has more moments that desired [de-identified] : no diastasis recti, after bimanual, pt reported burning sensation [de-identified] : 36yo P1 6 weeks postpartum, well healed, anxiety, breastfeeding difficulty [de-identified] : Keep lactation consult. Follow up with therapy. Recommended 4th trimester [place]. Demoed scar tissue massage. Discussed pregnancy spacing, would want to . Initiated POPs.

## 2021-07-14 ENCOUNTER — APPOINTMENT (OUTPATIENT)
Dept: OBGYN | Facility: CLINIC | Age: 36
End: 2021-07-14
Payer: COMMERCIAL

## 2021-07-14 VITALS — DIASTOLIC BLOOD PRESSURE: 70 MMHG | SYSTOLIC BLOOD PRESSURE: 110 MMHG

## 2021-07-14 DIAGNOSIS — M62.08 SEPARATION OF MUSCLE (NONTRAUMATIC), OTHER SITE: ICD-10-CM

## 2021-07-14 DIAGNOSIS — R30.0 DYSURIA: ICD-10-CM

## 2021-07-14 PROCEDURE — 99213 OFFICE O/P EST LOW 20 MIN: CPT

## 2021-07-14 PROCEDURE — 81002 URINALYSIS NONAUTO W/O SCOPE: CPT

## 2021-07-14 PROCEDURE — 99072 ADDL SUPL MATRL&STAF TM PHE: CPT

## 2021-07-15 LAB
APPEARANCE: CLEAR
BACTERIA: NEGATIVE
BILIRUBIN URINE: NEGATIVE
BLOOD URINE: NEGATIVE
COLOR: COLORLESS
GLUCOSE QUALITATIVE U: NEGATIVE
HYALINE CASTS: 1 /LPF
KETONES URINE: NEGATIVE
LEUKOCYTE ESTERASE URINE: NEGATIVE
MICROSCOPIC-UA: NORMAL
NITRITE URINE: NEGATIVE
PH URINE: 7
PROTEIN URINE: NEGATIVE
RED BLOOD CELLS URINE: 1 /HPF
SPECIFIC GRAVITY URINE: 1.01
SQUAMOUS EPITHELIAL CELLS: 1 /HPF
UROBILINOGEN URINE: NORMAL
WHITE BLOOD CELLS URINE: 1 /HPF

## 2021-07-16 LAB
BACTERIA UR CULT: NORMAL
CANDIDA VAG CYTO: NOT DETECTED
G VAGINALIS+PREV SP MTYP VAG QL MICRO: DETECTED
T VAGINALIS VAG QL WET PREP: NOT DETECTED

## 2021-07-16 RX ORDER — METRONIDAZOLE 500 MG/1
500 TABLET ORAL TWICE DAILY
Qty: 14 | Refills: 0 | Status: ACTIVE | COMMUNITY
Start: 2021-07-16 | End: 1900-01-01

## 2021-07-30 ENCOUNTER — APPOINTMENT (OUTPATIENT)
Dept: DERMATOLOGY | Facility: CLINIC | Age: 36
End: 2021-07-30
Payer: COMMERCIAL

## 2021-07-30 PROCEDURE — 99213 OFFICE O/P EST LOW 20 MIN: CPT

## 2021-08-13 ENCOUNTER — NON-APPOINTMENT (OUTPATIENT)
Age: 36
End: 2021-08-13

## 2022-01-01 NOTE — OB RN TRIAGE NOTE - NS_SOURCEOFINFO_OBGYN_ALL_OB
Brenda Perez discharged to Home, accompanied by mother.    If you have any questions about your baby, please call your baby's doctor.    DO NOT GIVE BABY ANY MEDICATION unless directed by your doctor.    Brenda Perez is a 2 day old female 7 lb 1.2 oz (3210 g) infant, delivered at Gestational Age: 41w1d on 2022.    BIRTH HISTORY:     Delivery Method: Vaginal, Spontaneous [250]   Rupture date & time: 2022 7:59 AM   Date & time of birth 2022 11:39 AM   Induction: Oxytocin;Misoprostol;AROM Post-term Gestation   Labor complications: None     Placenta appearance: Intact   Cord info/complications: 3 Vessels None       Resuscitation method: Suctioning   Delayed cord clamping: Yes   Indications for :     Presentation/position: Vertex         Forceps attempted?       Vacuum attempted?       Shoulder dystocia?           INFORMATION   Resuscitation:  Suctioning  Observed anomalies:           APGARS  One minute Five minutes   Skin color: 1  1    Heart rate: 2  2     Reflex: 2  2    Muscle tone: 2  2    Breathin  2    Totals:   9  9      Birth Measurements:  Weight: 7 lb 1.2 oz (3210 g)  Length: 20\"  Head circumference:      Weight change since birth: -5%  Discharge Weight: 3050 g (22 0000)     Labs:  Cord Blood Evaluation:No results found  Blood gases sent? No   Cord pH No results found  CBC:  No results found for: WBC  No results found for: RBC  No results found for: HCT  No results found for: HGB  No results found for: PLT     Bilirubin   Lab Results   Component Value Date/Time    BILIRUBIN 6.7 (H) 2022 01:08 PM      TCB Result:    TCB Site:          Infant Blood Type: O Rh Positive  Glucose monitoring: No    Screenings/ Immunizations:  [place] Screen: done, results pending  CHD screening complete: Done (22 1246)   Right hand reading %: 97 %   Foot reading %: 98 %   CHD: Normal  Hearing exam: [place] Hearing Test Machine: Auditory Brainstem  Response (Zeyad) (22 1300)  [place] Hearing Test Results: Pass R, Pass L (22 1300)  Immunizations:   Most Recent Immunizations   Administered Date(s) Administered   • Hep B, adolescent or pediatric 2022     Car Seat Test:      Feeding: [place] is currently being fed Breast milk only.    Procedures: None    Consults: None     Instructions: Baby's mom has been given A New Beginning: A Guide for Postpartum and  Care discharge instructions, including information regarding feeding, any restrictions, and follow up information.     Call the Doctor if:  ·  Fever 100 degrees F or above  ·       Forceful vomiting (not spitting up)  ·  Several feedings when infant does not suck  ·  Watery, runny stools (mucous, blood or foul odor)  ·  Infant injury (fall from bed or table, dropped or severely shaken)  ·  Constant crying  ·  Any unusual rash  ·  Yellow color of the eyes or skin  ·  Has less than 4 wet diapers in a 24 hr period in the first week of life, and less    than 6 wet diapers in a 24 hr period after the baby is 7 days old  ·  No stool for 48 hours  ·  Redness, drainage or foul odor from the umbilical cord    In case you need to call about any of the above  ·  Take baby's temperature and write it down  ·  Know how much and how many feedings the infant had that day  ·  Note amount, color, consistency of urine and stools  ·      Note any changes in the infant's behavior such as being sleepy, very fussy or less active   Patient

## 2022-01-04 ENCOUNTER — TRANSCRIPTION ENCOUNTER (OUTPATIENT)
Age: 37
End: 2022-01-04

## 2022-02-16 ENCOUNTER — APPOINTMENT (OUTPATIENT)
Dept: OBGYN | Facility: CLINIC | Age: 37
End: 2022-02-16

## 2022-04-18 ENCOUNTER — APPOINTMENT (OUTPATIENT)
Dept: OBGYN | Facility: CLINIC | Age: 37
End: 2022-04-18

## 2022-04-19 ENCOUNTER — NON-APPOINTMENT (OUTPATIENT)
Age: 37
End: 2022-04-19

## 2022-04-20 ENCOUNTER — APPOINTMENT (OUTPATIENT)
Dept: OBGYN | Facility: CLINIC | Age: 37
End: 2022-04-20
Payer: COMMERCIAL

## 2022-04-20 VITALS
HEART RATE: 60 BPM | BODY MASS INDEX: 24.72 KG/M2 | TEMPERATURE: 97.8 F | DIASTOLIC BLOOD PRESSURE: 62 MMHG | WEIGHT: 144 LBS | SYSTOLIC BLOOD PRESSURE: 112 MMHG

## 2022-04-20 PROCEDURE — 99213 OFFICE O/P EST LOW 20 MIN: CPT

## 2022-04-20 NOTE — PHYSICAL EXAM

## 2022-04-25 LAB
CYTOLOGY CVX/VAG DOC THIN PREP: NORMAL
HPV 16 E6+E7 MRNA CVX QL NAA+PROBE: NOT DETECTED
HPV HIGH+LOW RISK DNA PNL CVX: NOT DETECTED
HPV18+45 E6+E7 MRNA CVX QL NAA+PROBE: NOT DETECTED

## 2022-06-08 ENCOUNTER — APPOINTMENT (OUTPATIENT)
Dept: OBGYN | Facility: CLINIC | Age: 37
End: 2022-06-08

## 2022-07-16 ENCOUNTER — OFFICE (OUTPATIENT)
Dept: URBAN - METROPOLITAN AREA CLINIC 112 | Facility: CLINIC | Age: 37
Setting detail: OPHTHALMOLOGY
End: 2022-07-16
Payer: COMMERCIAL

## 2022-07-16 DIAGNOSIS — D48.5: ICD-10-CM

## 2022-07-16 PROCEDURE — 92004 COMPRE OPH EXAM NEW PT 1/>: CPT | Performed by: OPHTHALMOLOGY

## 2022-07-16 PROCEDURE — 92285 EXTERNAL OCULAR PHOTOGRAPHY: CPT | Performed by: OPHTHALMOLOGY

## 2022-07-16 ASSESSMENT — SPHEQUIV_DERIVED
OD_SPHEQUIV: 0.25
OS_SPHEQUIV: 0.125

## 2022-07-16 ASSESSMENT — KERATOMETRY
OS_AXISANGLE_DEGREES: 092
OS_K2POWER_DIOPTERS: 44.00
OD_K2POWER_DIOPTERS: 44.00
OD_K1POWER_DIOPTERS: 44.00
OD_AXISANGLE_DEGREES: 090
OS_K1POWER_DIOPTERS: 43.50

## 2022-07-16 ASSESSMENT — REFRACTION_AUTOREFRACTION
OD_SPHERE: +0.50
OS_AXIS: 173
OD_CYLINDER: -0.50
OS_CYLINDER: -0.25
OS_SPHERE: +0.25
OD_AXIS: 094

## 2022-07-16 ASSESSMENT — AXIALLENGTH_DERIVED
OD_AL: 23.3142
OS_AL: 23.4522

## 2022-07-16 ASSESSMENT — TONOMETRY
OD_IOP_MMHG: 21
OS_IOP_MMHG: 19

## 2022-07-16 ASSESSMENT — VISUAL ACUITY
OS_BCVA: 20/20
OD_BCVA: 20/20

## 2022-07-16 ASSESSMENT — CONFRONTATIONAL VISUAL FIELD TEST (CVF)
OD_FINDINGS: FULL
OS_FINDINGS: FULL

## 2022-07-26 ENCOUNTER — OFFICE (OUTPATIENT)
Dept: URBAN - METROPOLITAN AREA CLINIC 112 | Facility: CLINIC | Age: 37
Setting detail: OPHTHALMOLOGY
End: 2022-07-26
Payer: COMMERCIAL

## 2022-07-26 DIAGNOSIS — D48.5: ICD-10-CM

## 2022-07-26 PROCEDURE — 67840 REMOVE EYELID LESION: CPT | Performed by: OPHTHALMOLOGY

## 2022-07-26 ASSESSMENT — CONFRONTATIONAL VISUAL FIELD TEST (CVF)
OS_FINDINGS: FULL
OD_FINDINGS: FULL

## 2022-07-26 ASSESSMENT — TONOMETRY
OS_IOP_MMHG: 16
OD_IOP_MMHG: 15

## 2022-07-27 ENCOUNTER — RX ONLY (RX ONLY)
Age: 37
End: 2022-07-27

## 2022-07-27 ASSESSMENT — AXIALLENGTH_DERIVED
OS_AL: 23.4522
OD_AL: 23.3142

## 2022-07-27 ASSESSMENT — REFRACTION_AUTOREFRACTION
OS_SPHERE: +0.25
OD_SPHERE: +0.50
OD_CYLINDER: -0.50
OS_CYLINDER: -0.25
OD_AXIS: 094
OS_AXIS: 173

## 2022-07-27 ASSESSMENT — KERATOMETRY
OS_AXISANGLE_DEGREES: 092
OD_K1POWER_DIOPTERS: 44.00
OS_K1POWER_DIOPTERS: 43.50
OS_K2POWER_DIOPTERS: 44.00
OD_K2POWER_DIOPTERS: 44.00
OD_AXISANGLE_DEGREES: 090

## 2022-07-27 ASSESSMENT — SPHEQUIV_DERIVED
OD_SPHEQUIV: 0.25
OS_SPHEQUIV: 0.125

## 2022-07-27 ASSESSMENT — VISUAL ACUITY
OS_BCVA: 20/20
OD_BCVA: 20/20

## 2022-08-04 ENCOUNTER — APPOINTMENT (OUTPATIENT)
Dept: DERMATOLOGY | Facility: CLINIC | Age: 37
End: 2022-08-04

## 2022-08-16 ENCOUNTER — OFFICE (OUTPATIENT)
Dept: URBAN - METROPOLITAN AREA CLINIC 94 | Facility: CLINIC | Age: 37
Setting detail: OPHTHALMOLOGY
End: 2022-08-16
Payer: COMMERCIAL

## 2022-08-16 DIAGNOSIS — D48.5: ICD-10-CM

## 2022-08-16 PROCEDURE — 99213 OFFICE O/P EST LOW 20 MIN: CPT | Performed by: OPHTHALMOLOGY

## 2022-08-16 ASSESSMENT — REFRACTION_AUTOREFRACTION
OD_SPHERE: +0.50
OD_CYLINDER: -0.50
OS_AXIS: 173
OS_SPHERE: +0.25
OD_AXIS: 094
OS_CYLINDER: -0.25

## 2022-08-16 ASSESSMENT — SPHEQUIV_DERIVED
OS_SPHEQUIV: 0.125
OD_SPHEQUIV: 0.25

## 2022-08-16 ASSESSMENT — KERATOMETRY
OS_AXISANGLE_DEGREES: 092
OS_K2POWER_DIOPTERS: 44.00
OD_K2POWER_DIOPTERS: 44.00
OD_AXISANGLE_DEGREES: 090
OD_K1POWER_DIOPTERS: 44.00
OS_K1POWER_DIOPTERS: 43.50

## 2022-08-16 ASSESSMENT — AXIALLENGTH_DERIVED
OS_AL: 23.4522
OD_AL: 23.3142

## 2022-08-16 ASSESSMENT — TONOMETRY
OD_IOP_MMHG: 15
OS_IOP_MMHG: 17

## 2022-08-16 ASSESSMENT — VISUAL ACUITY
OD_BCVA: 20/20
OS_BCVA: 20/20

## 2022-10-05 ENCOUNTER — APPOINTMENT (OUTPATIENT)
Dept: OBGYN | Facility: CLINIC | Age: 37
End: 2022-10-05

## 2022-10-05 ENCOUNTER — NON-APPOINTMENT (OUTPATIENT)
Age: 37
End: 2022-10-05

## 2022-10-05 VITALS
BODY MASS INDEX: 24.75 KG/M2 | HEIGHT: 64 IN | DIASTOLIC BLOOD PRESSURE: 70 MMHG | WEIGHT: 145 LBS | SYSTOLIC BLOOD PRESSURE: 110 MMHG

## 2022-10-05 DIAGNOSIS — Z76.89 PERSONS ENCOUNTERING HEALTH SERVICES IN OTHER SPECIFIED CIRCUMSTANCES: ICD-10-CM

## 2022-10-05 PROCEDURE — 99213 OFFICE O/P EST LOW 20 MIN: CPT

## 2022-10-08 DIAGNOSIS — Z87.42 PERSONAL HISTORY OF OTHER DISEASES OF THE FEMALE GENITAL TRACT: ICD-10-CM

## 2022-10-08 PROBLEM — Z76.89 ENCOUNTER TO ESTABLISH CARE WITH NEW DOCTOR: Status: ACTIVE | Noted: 2022-10-08

## 2022-10-08 NOTE — PLAN
[FreeTextEntry1] : 37-year-old female for new patient visit.\par \par 1.  Patient up-to-date with annual exam.  She had a Pap smear done in April 2022.\par 2.  Family history of breast cancer.  She is a candidate for genetic testing.  All risks and benefits of MyRisk testing reviewed with the patient.  She is interested in having this testing done and will schedule an appointment.  Her maternal cousin was diagnosed at the age of 32.  She had a mammogram done this summer which was negative.\par 3.  Trying to conceive.  Preconceptual counseling reviewed with the patient.  We discussed the menstrual cycle, ovulation, ovulation prediction kits, timed intercourse, etc.  Advised the patient to continue prenatal vitamins.  We discussed that she should call the office with a positive urine pregnancy test.\par 4.  Abnormality of right nipple.  Upon examination appears to be nipple blood.  The patient was advised to keep an eye on the area and if there are any changes to let us know.\par 5.  Annual exam in April 2023.

## 2022-10-08 NOTE — HISTORY OF PRESENT ILLNESS
[FreeTextEntry1] : 37-year-old female presents for new patient visit.  Her last annual exam was in 2022.  She was previously seeing midwives and is trying to get pregnant again.  She would like to establish care with a physician.  She started trying to conceive last month.  She recently stopped breast-feeding her baby.  She is complaining of a small growth on her right nipple.  She states it is not painful and has not changed.  It is not bothersome but she would like to have it looked at today.  Menarche was at the age of 12.  Her menses are every 24 to 25 days, lasting 5 days.  She states her menses are heavy but does not soak through more than 1 pad per hour.  She does get cramping.  She has a history of an abnormal Pap smear.  She has no other significant GYN history.  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  Past surgical history includes a  section.  Family history is significant for a maternal cousin with breast cancer at the age of 32.  The patient had a mammogram done over the summer which was negative.  The patient has not had genetic testing.  She does not think her cousin had genetic testing.  She is interested in having genetic testing done.  Social history is significant for social alcohol use.  She denies tobacco or illicit drugs.  GYN history as above.  OB history: -0-0-1.  She has a history of a full-term  section for oligohydramnios and nonreassuring fetal heart tracing.  She is allergic to cephalosporins.  She is currently taking prenatal vitamins and magnesium.

## 2022-11-09 ENCOUNTER — RESULT CHARGE (OUTPATIENT)
Age: 37
End: 2022-11-09

## 2022-11-09 ENCOUNTER — APPOINTMENT (OUTPATIENT)
Dept: OBGYN | Facility: CLINIC | Age: 37
End: 2022-11-09
Payer: COMMERCIAL

## 2022-11-09 VITALS
DIASTOLIC BLOOD PRESSURE: 85 MMHG | WEIGHT: 154 LBS | BODY MASS INDEX: 26.29 KG/M2 | SYSTOLIC BLOOD PRESSURE: 125 MMHG | HEIGHT: 64 IN

## 2022-11-09 DIAGNOSIS — Z80.3 FAMILY HISTORY OF MALIGNANT NEOPLASM OF BREAST: ICD-10-CM

## 2022-11-09 PROCEDURE — 81025 URINE PREGNANCY TEST: CPT

## 2022-11-09 PROCEDURE — 99214 OFFICE O/P EST MOD 30 MIN: CPT | Mod: 25

## 2022-11-09 PROCEDURE — 76815 OB US LIMITED FETUS(S): CPT

## 2022-11-11 LAB — HCG UR QL: POSITIVE

## 2022-11-29 ENCOUNTER — LABORATORY RESULT (OUTPATIENT)
Age: 37
End: 2022-11-29

## 2022-11-29 ENCOUNTER — NON-APPOINTMENT (OUTPATIENT)
Age: 37
End: 2022-11-29

## 2022-11-29 ENCOUNTER — APPOINTMENT (OUTPATIENT)
Dept: OBGYN | Facility: CLINIC | Age: 37
End: 2022-11-29

## 2022-11-29 ENCOUNTER — MED ADMIN CHARGE (OUTPATIENT)
Age: 37
End: 2022-11-29

## 2022-11-29 VITALS
WEIGHT: 154 LBS | SYSTOLIC BLOOD PRESSURE: 110 MMHG | DIASTOLIC BLOOD PRESSURE: 70 MMHG | HEIGHT: 64 IN | BODY MASS INDEX: 26.29 KG/M2

## 2022-11-29 PROCEDURE — 0500F INITIAL PRENATAL CARE VISIT: CPT

## 2022-12-01 ENCOUNTER — NON-APPOINTMENT (OUTPATIENT)
Age: 37
End: 2022-12-01

## 2022-12-02 ENCOUNTER — APPOINTMENT (OUTPATIENT)
Dept: MATERNAL FETAL MEDICINE | Facility: CLINIC | Age: 37
End: 2022-12-02

## 2022-12-02 ENCOUNTER — ASOB RESULT (OUTPATIENT)
Age: 37
End: 2022-12-02

## 2022-12-02 PROCEDURE — 99202 OFFICE O/P NEW SF 15 MIN: CPT | Mod: 95

## 2022-12-06 ENCOUNTER — NON-APPOINTMENT (OUTPATIENT)
Age: 37
End: 2022-12-06

## 2022-12-06 LAB
CLARI ADDITIONAL INFO: NORMAL
CLARI CHROMOSOME 13: NORMAL
CLARI CHROMOSOME 18: NORMAL
CLARI CHROMOSOME 21: NORMAL
CLARI SEX CHROMOSOMES: NORMAL
CLARI TEST COMMENT: NORMAL
CLARITEST NIPT: NORMAL
FETAL FRACT: NORMAL
GESTATION AGE: NORMAL
MATERNAL WEIGHT (LBS):: NORMAL
PLEASE INCLUDE GENDER RESULTS ON THIS REPORT:: NORMAL
TYPE OF PREGNANCY:: NORMAL

## 2022-12-20 ENCOUNTER — NON-APPOINTMENT (OUTPATIENT)
Age: 37
End: 2022-12-20

## 2022-12-21 ENCOUNTER — APPOINTMENT (OUTPATIENT)
Dept: OBGYN | Facility: CLINIC | Age: 37
End: 2022-12-21

## 2022-12-21 VITALS
WEIGHT: 154 LBS | HEIGHT: 64 IN | SYSTOLIC BLOOD PRESSURE: 110 MMHG | BODY MASS INDEX: 26.29 KG/M2 | DIASTOLIC BLOOD PRESSURE: 70 MMHG

## 2022-12-21 PROCEDURE — 0502F SUBSEQUENT PRENATAL CARE: CPT

## 2022-12-29 ENCOUNTER — APPOINTMENT (OUTPATIENT)
Dept: OBGYN | Facility: CLINIC | Age: 37
End: 2022-12-29

## 2022-12-29 VITALS
WEIGHT: 156 LBS | HEIGHT: 64 IN | BODY MASS INDEX: 26.63 KG/M2 | DIASTOLIC BLOOD PRESSURE: 58 MMHG | SYSTOLIC BLOOD PRESSURE: 98 MMHG

## 2022-12-29 PROCEDURE — 0502F SUBSEQUENT PRENATAL CARE: CPT

## 2023-01-04 DIAGNOSIS — O26.899 OTHER SPECIFIED PREGNANCY RELATED CONDITIONS, UNSPECIFIED TRIMESTER: ICD-10-CM

## 2023-01-04 DIAGNOSIS — R19.7 OTHER SPECIFIED PREGNANCY RELATED CONDITIONS, UNSPECIFIED TRIMESTER: ICD-10-CM

## 2023-01-08 ENCOUNTER — NON-APPOINTMENT (OUTPATIENT)
Age: 38
End: 2023-01-08

## 2023-01-08 PROBLEM — Z80.3 FAMILY HISTORY OF MALIGNANT NEOPLASM OF BREAST: Status: ACTIVE | Noted: 2019-01-15

## 2023-01-08 LAB
AFP MOM: 0.57
AFP VALUE: 19.5 NG/ML
ALPHA FETOPROTEIN SERUM COMMENT: NORMAL
ALPHA FETOPROTEIN SERUM INTERPRETATION: NORMAL
ALPHA FETOPROTEIN SERUM RESULTS: NORMAL
ALPHA FETOPROTEIN SERUM TEST RESULTS: NORMAL
GESTATIONAL AGE BASED ON: NORMAL
GESTATIONAL AGE ON COLLECTION DATE: 16 WEEKS
INSULIN DEP DIABETES: NO
MATERNAL AGE AT EDD AFP: 39 YR
MULTIPLE GESTATION: NO
OSBR RISK 1 IN: NORMAL
RACE: NORMAL
WEIGHT AFP: 154 LBS

## 2023-01-08 NOTE — HISTORY OF PRESENT ILLNESS
[FreeTextEntry1] : 37-year-old female -0-0-1 at 8 weeks by LMP of 2022 presents for positive urine pregnancy test at home.  At her last visit we had discussed that she was trying to conceive.  She denies any vaginal bleeding or cramping.  She does admit to fatigue and nausea but denies vomiting.  She is not interested in trying medications at this time.  She is taking prenatal vitamins.  \par \par She would also like to have MyRisk testing done today.  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  Past surgical history includes a  section.  Social history is negative x3.  No significant GYN history.  OB history: -0-0-1.  She has a history of a full-term  section for oligohydramnios and nonreassuring fetal heart tracing.  She is allergic to cephalosporins.  She is currently taking prenatal vitamins and magnesium.

## 2023-01-08 NOTE — PLAN
[FreeTextEntry1] : 37-year-old female -0-0-1 at 8 weeks by LMP of 2022.  EDC confirmed today by today's sonogram of 2023.  Do's and don'ts of pregnancy were discussed with the patient at length.  Advised the patient to continue prenatal vitamins.  We discussed nausea and vomiting during pregnancy.  We discussed dietary modifications and precautions.  She is not interested in medications at this time.  She has advanced maternal age.  We discussed the increased risk of chromosomal abnormalities with advanced maternal age.  Patient is aware that we will start baby aspirin therapy at 12 weeks of gestation.  The patient was given the opportunity to ask questions and all were answered to her satisfaction.  Call parameters were reviewed.  She will return to the office in 2 weeks for another sonogram and her first prenatal visit.\par \par Patient with family history of breast cancer in a first cousin who was 32 at the age of diagnosis.  MyRisk testing was done today.  The patient was counseled on the risks and benefits of genetic testing.  She will return to the office in 4 to 6 weeks to discuss the results.

## 2023-01-10 LAB — BACTERIA BLD CULT: NORMAL

## 2023-01-16 ENCOUNTER — APPOINTMENT (OUTPATIENT)
Dept: OBGYN | Facility: CLINIC | Age: 38
End: 2023-01-16
Payer: COMMERCIAL

## 2023-01-16 VITALS
BODY MASS INDEX: 27.31 KG/M2 | SYSTOLIC BLOOD PRESSURE: 110 MMHG | HEIGHT: 64 IN | DIASTOLIC BLOOD PRESSURE: 70 MMHG | WEIGHT: 160 LBS

## 2023-01-16 PROCEDURE — 0502F SUBSEQUENT PRENATAL CARE: CPT

## 2023-02-03 ENCOUNTER — ASOB RESULT (OUTPATIENT)
Age: 38
End: 2023-02-03

## 2023-02-03 ENCOUNTER — APPOINTMENT (OUTPATIENT)
Dept: ANTEPARTUM | Facility: CLINIC | Age: 38
End: 2023-02-03
Payer: COMMERCIAL

## 2023-02-03 PROCEDURE — 76817 TRANSVAGINAL US OBSTETRIC: CPT | Mod: 59

## 2023-02-03 PROCEDURE — 76811 OB US DETAILED SNGL FETUS: CPT | Mod: 59

## 2023-02-11 ENCOUNTER — NON-APPOINTMENT (OUTPATIENT)
Age: 38
End: 2023-02-11

## 2023-02-13 ENCOUNTER — NON-APPOINTMENT (OUTPATIENT)
Age: 38
End: 2023-02-13

## 2023-02-15 ENCOUNTER — NON-APPOINTMENT (OUTPATIENT)
Age: 38
End: 2023-02-15

## 2023-02-20 ENCOUNTER — OUTPATIENT (OUTPATIENT)
Dept: OUTPATIENT SERVICES | Facility: HOSPITAL | Age: 38
LOS: 1 days | End: 2023-02-20
Payer: COMMERCIAL

## 2023-02-20 VITALS — SYSTOLIC BLOOD PRESSURE: 115 MMHG | HEART RATE: 81 BPM | DIASTOLIC BLOOD PRESSURE: 78 MMHG

## 2023-02-20 VITALS
SYSTOLIC BLOOD PRESSURE: 115 MMHG | DIASTOLIC BLOOD PRESSURE: 78 MMHG | RESPIRATION RATE: 18 BRPM | HEART RATE: 81 BPM | TEMPERATURE: 98 F

## 2023-02-20 DIAGNOSIS — O47.02 FALSE LABOR BEFORE 37 COMPLETED WEEKS OF GESTATION, SECOND TRIMESTER: ICD-10-CM

## 2023-02-20 DIAGNOSIS — Z98.891 HISTORY OF UTERINE SCAR FROM PREVIOUS SURGERY: Chronic | ICD-10-CM

## 2023-02-20 PROCEDURE — 76819 FETAL BIOPHYS PROFIL W/O NST: CPT

## 2023-02-20 PROCEDURE — G0463: CPT

## 2023-02-20 PROCEDURE — 99214 OFFICE O/P EST MOD 30 MIN: CPT | Mod: GC

## 2023-02-20 NOTE — OB PROVIDER TRIAGE NOTE - HISTORY OF PRESENT ILLNESS
37y  at 22w5d GA by LMP who presents to L&D for decreased FM after dinner. She was COVID pos 1 week ago; tested negative today and not symptomatic at this time. Patient denies vaginal bleeding, contractions and leakage of fluid. She endorses good fetal movement here in L&D. Denies fevers, chills, nausea, vomiting, chest pain, SOB, dizziness and headache. No other complaints at this time.     AGATHA: 23  LMP: 22    Prenatal course uncomplicated.      POB:  - pCS NRFHT  PGYN: -fibroids, -ovarian cysts, denies STD hx, denies abnormal PAPs   PMH: IBS, anxiety  PSH: Denies  SH: Denies EtOH, tobacco and illicit drug use during this pregnancy; feels safe at home   Meds: PNVs  Allergies: NKDA

## 2023-02-20 NOTE — OB PROVIDER TRIAGE NOTE - NSHPPHYSICALEXAM_GEN_ALL_CORE
Vital Signs Last 24 Hrs  T(C): 36.8 (20 Feb 2023 20:31), Max: 36.8 (20 Feb 2023 20:31)  T(F): 98.2 (20 Feb 2023 20:31), Max: 98.2 (20 Feb 2023 20:31)  HR: 81 (20 Feb 2023 20:36) (81 - 81)  BP: 115/78 (20 Feb 2023 20:36) (115/78 - 115/78)  RR: 18 (20 Feb 2023 20:31) (18 - 18)    General: AAOx3, well appearing  Abdomen: soft, gravid, nontender, nonperitonitic  FHR: 145  Bedside sono: MVP>5, breech, good fetal movement

## 2023-02-20 NOTE — OB PROVIDER TRIAGE NOTE - NSOBPROVIDERNOTE_OBGYN_ALL_OB_FT
37y  at 22w5d GA by LMP who presents to L&D for decreased FM after dinner.     -VSS  -   -FM on sono; breech; posterior placenta; adequate fluid  -Pt reassured  -Return precautions reviewed    D/w Dr. Navas

## 2023-02-20 NOTE — OB PROVIDER TRIAGE NOTE - ATTENDING COMMENTS
37y  at 22w5d GA by LMP who presents to L&D for decreased FM now resolved, movement counts discussed.

## 2023-02-23 ENCOUNTER — APPOINTMENT (OUTPATIENT)
Dept: SURGERY | Facility: CLINIC | Age: 38
End: 2023-02-23
Payer: COMMERCIAL

## 2023-02-23 VITALS
SYSTOLIC BLOOD PRESSURE: 108 MMHG | BODY MASS INDEX: 27.31 KG/M2 | DIASTOLIC BLOOD PRESSURE: 70 MMHG | WEIGHT: 160 LBS | HEIGHT: 64 IN

## 2023-02-23 DIAGNOSIS — N64.9 DISORDER OF BREAST, UNSPECIFIED: ICD-10-CM

## 2023-02-23 DIAGNOSIS — R92.2 INCONCLUSIVE MAMMOGRAM: ICD-10-CM

## 2023-02-23 PROCEDURE — 99203 OFFICE O/P NEW LOW 30 MIN: CPT

## 2023-02-23 NOTE — PAST MEDICAL HISTORY
[Menstruating] : The patient is menstruating [Menarche Age ____] : age at menarche was [unfilled] [Definite ___ (Date)] : the last menstrual period was [unfilled] [Total Preg ___] : G[unfilled] [Live Births ___] : P[unfilled]  [Living ___] : Living: [unfilled] [Age At Live Birth ___] : Age at live birth: [unfilled] [History of Hormone Replacement Treatment] : has no history of hormone replacement treatment [FreeTextEntry3] : Last time she was not pregnant. [FreeTextEntry5] :  = 2021 [FreeTextEntry2] : 1 baby in utero = Girl / 1 Boy [FreeTextEntry6] : No. [FreeTextEntry7] : Yes, in the early 2000's for a year. [FreeTextEntry8] : Yes, 14 months.

## 2023-02-23 NOTE — ASSESSMENT
[FreeTextEntry1] : 37 year old female with a right nipple bleb. \par \par We discussed her exam findings. I explained to her that the polyp appears benign without concerning findings. I explained to her that based on the size, size of the base, and location, it may be difficult to remove in the office. Alternatively, this could be done in the OR, and risks vs benefit while pregnant were discussed. \par \par I recommended trial of warm compresses, epsom salts, or hydrocortisone cream in the interim. This may also improve once she begins breastfeeding again. I did not feel strongly that it would interfere with her ability to breastfeed in the future, but discussed this can fluctuate with different pregnancies. I advised her to followup as needed. \par \par Patient verbalized understanding for all treatment plans discussed. All of her questions were answered to the best of my ability. She was encouraged to call the office if any questions or concerns or come in sooner if needed.\par \par \par PLAN\par 1. Follow-up PRN\par 2. Warm compresses, epsom salts

## 2023-02-23 NOTE — HISTORY OF PRESENT ILLNESS
[FreeTextEntry1] : Problem List\par 1. Right nipple bleb \par \par PCP-Jenn Srinivasan\par GYN- Sheela Patterson \par \par HPI: \par Patient is a 37 year old female who presents for an initial consultation for right nipple bleb. She states she nursed her first child for 14 months, and during that time she experienced a right nipple bleb as she was weaning from breastfeeding. She is now pregnant again and hoping to breastfeed, but worried the bleb will interfere. Back in December/January she noticed the area was larger and painful, but has since improved and is now only a small protrusion. \par \par BREAST HISTORY: No prior breast biopsies or surgeries. \par \par \par IMAGING:\par 07/25/22- [facility]- Screening mammogram: Density C. No  mammographic evidence of malignancy

## 2023-02-23 NOTE — ADDENDUM
[FreeTextEntry1] : This note was written by Judit Berrios, acting as the  for Dr. Escamilla. This note accurately reflects the work and decisions made by Dr. Escamilla.

## 2023-02-23 NOTE — PHYSICAL EXAM
[Normocephalic] : normocephalic [Atraumatic] : atraumatic [Supple] : supple [No Supraclavicular Adenopathy] : no supraclavicular adenopathy [Examined in the supine and seated position] : examined in the supine and seated position [No dominant masses] : no dominant masses in right breast  [No dominant masses] : no dominant masses left breast [No Nipple Retraction] : no left nipple retraction [No Nipple Discharge] : no left nipple discharge [No Axillary Lymphadenopathy] : no left axillary lymphadenopathy [No Edema] : no edema [No Rashes] : no rashes [No Ulceration] : no ulceration [Symmetrical] : symmetrical [Bra Size: ___] : Bra Size: [unfilled] [Grade 1] : Ptosis Grade 1 [de-identified] : There is a small polyp/protrusion to the lateral nipple without erythema or discharge.

## 2023-02-27 ENCOUNTER — APPOINTMENT (OUTPATIENT)
Dept: OBGYN | Facility: CLINIC | Age: 38
End: 2023-02-27

## 2023-03-03 ENCOUNTER — NON-APPOINTMENT (OUTPATIENT)
Age: 38
End: 2023-03-03

## 2023-03-15 ENCOUNTER — APPOINTMENT (OUTPATIENT)
Dept: OBGYN | Facility: CLINIC | Age: 38
End: 2023-03-15
Payer: COMMERCIAL

## 2023-03-15 VITALS
SYSTOLIC BLOOD PRESSURE: 137 MMHG | HEIGHT: 64 IN | BODY MASS INDEX: 28.68 KG/M2 | WEIGHT: 168 LBS | DIASTOLIC BLOOD PRESSURE: 74 MMHG

## 2023-03-15 PROCEDURE — 0502F SUBSEQUENT PRENATAL CARE: CPT

## 2023-03-15 PROCEDURE — 36415 COLL VENOUS BLD VENIPUNCTURE: CPT

## 2023-03-21 LAB
BASOPHILS # BLD AUTO: 0.04 K/UL
BASOPHILS NFR BLD AUTO: 0.4 %
EOSINOPHIL # BLD AUTO: 0.05 K/UL
EOSINOPHIL NFR BLD AUTO: 0.5 %
GLUCOSE 1H P 50 G GLC PO SERPL-MCNC: 113 MG/DL
HCT VFR BLD CALC: 35.6 %
HGB BLD-MCNC: 11.8 G/DL
IMM GRANULOCYTES NFR BLD AUTO: 0.7 %
LYMPHOCYTES # BLD AUTO: 1.8 K/UL
LYMPHOCYTES NFR BLD AUTO: 17.5 %
MAN DIFF?: NORMAL
MCHC RBC-ENTMCNC: 33.1 GM/DL
MCHC RBC-ENTMCNC: 33.1 PG
MCV RBC AUTO: 100 FL
MONOCYTES # BLD AUTO: 0.91 K/UL
MONOCYTES NFR BLD AUTO: 8.8 %
NEUTROPHILS # BLD AUTO: 7.44 K/UL
NEUTROPHILS NFR BLD AUTO: 72.1 %
PLATELET # BLD AUTO: 263 K/UL
RBC # BLD: 3.56 M/UL
RBC # FLD: 12.8 %
WBC # FLD AUTO: 10.31 K/UL

## 2023-03-22 LAB
T4 FREE SERPL-MCNC: 1 NG/DL
TSH SERPL-ACNC: 3.68 UIU/ML

## 2023-03-29 ENCOUNTER — NON-APPOINTMENT (OUTPATIENT)
Age: 38
End: 2023-03-29

## 2023-03-29 ENCOUNTER — ASOB RESULT (OUTPATIENT)
Age: 38
End: 2023-03-29

## 2023-03-29 ENCOUNTER — APPOINTMENT (OUTPATIENT)
Dept: ANTEPARTUM | Facility: CLINIC | Age: 38
End: 2023-03-29
Payer: COMMERCIAL

## 2023-03-29 ENCOUNTER — APPOINTMENT (OUTPATIENT)
Dept: OBGYN | Facility: CLINIC | Age: 38
End: 2023-03-29
Payer: COMMERCIAL

## 2023-03-29 VITALS
BODY MASS INDEX: 29.02 KG/M2 | HEIGHT: 64 IN | WEIGHT: 170 LBS | SYSTOLIC BLOOD PRESSURE: 116 MMHG | DIASTOLIC BLOOD PRESSURE: 70 MMHG

## 2023-03-29 PROCEDURE — 90715 TDAP VACCINE 7 YRS/> IM: CPT

## 2023-03-29 PROCEDURE — 90471 IMMUNIZATION ADMIN: CPT

## 2023-03-29 PROCEDURE — 76816 OB US FOLLOW-UP PER FETUS: CPT

## 2023-03-29 PROCEDURE — 0502F SUBSEQUENT PRENATAL CARE: CPT

## 2023-04-05 ENCOUNTER — NON-APPOINTMENT (OUTPATIENT)
Age: 38
End: 2023-04-05

## 2023-04-10 ENCOUNTER — APPOINTMENT (OUTPATIENT)
Dept: BREAST CENTER | Facility: CLINIC | Age: 38
End: 2023-04-10

## 2023-04-12 ENCOUNTER — APPOINTMENT (OUTPATIENT)
Dept: OBGYN | Facility: CLINIC | Age: 38
End: 2023-04-12
Payer: COMMERCIAL

## 2023-04-12 VITALS
DIASTOLIC BLOOD PRESSURE: 70 MMHG | SYSTOLIC BLOOD PRESSURE: 107 MMHG | BODY MASS INDEX: 29.71 KG/M2 | WEIGHT: 174 LBS | HEIGHT: 64 IN

## 2023-04-12 PROCEDURE — 0502F SUBSEQUENT PRENATAL CARE: CPT

## 2023-04-20 ENCOUNTER — APPOINTMENT (OUTPATIENT)
Dept: ANTEPARTUM | Facility: CLINIC | Age: 38
End: 2023-04-20

## 2023-04-24 ENCOUNTER — APPOINTMENT (OUTPATIENT)
Dept: OBGYN | Facility: CLINIC | Age: 38
End: 2023-04-24
Payer: COMMERCIAL

## 2023-04-24 VITALS
SYSTOLIC BLOOD PRESSURE: 117 MMHG | BODY MASS INDEX: 30.05 KG/M2 | HEIGHT: 64 IN | DIASTOLIC BLOOD PRESSURE: 76 MMHG | WEIGHT: 176 LBS

## 2023-04-24 PROCEDURE — 0502F SUBSEQUENT PRENATAL CARE: CPT

## 2023-05-04 ENCOUNTER — OUTPATIENT (OUTPATIENT)
Dept: OUTPATIENT SERVICES | Facility: HOSPITAL | Age: 38
LOS: 1 days | End: 2023-05-04
Payer: COMMERCIAL

## 2023-05-04 VITALS — HEART RATE: 89 BPM | DIASTOLIC BLOOD PRESSURE: 77 MMHG | SYSTOLIC BLOOD PRESSURE: 125 MMHG

## 2023-05-04 VITALS — DIASTOLIC BLOOD PRESSURE: 72 MMHG | SYSTOLIC BLOOD PRESSURE: 122 MMHG | HEART RATE: 88 BPM

## 2023-05-04 DIAGNOSIS — Z98.891 HISTORY OF UTERINE SCAR FROM PREVIOUS SURGERY: Chronic | ICD-10-CM

## 2023-05-04 DIAGNOSIS — O47.02 FALSE LABOR BEFORE 37 COMPLETED WEEKS OF GESTATION, SECOND TRIMESTER: ICD-10-CM

## 2023-05-04 LAB

## 2023-05-04 PROCEDURE — 59025 FETAL NON-STRESS TEST: CPT

## 2023-05-04 PROCEDURE — 99214 OFFICE O/P EST MOD 30 MIN: CPT | Mod: GC

## 2023-05-04 PROCEDURE — 36415 COLL VENOUS BLD VENIPUNCTURE: CPT

## 2023-05-04 PROCEDURE — 87800 DETECT AGNT MULT DNA DIREC: CPT

## 2023-05-04 PROCEDURE — 83986 ASSAY PH BODY FLUID NOS: CPT

## 2023-05-04 PROCEDURE — 81001 URINALYSIS AUTO W/SCOPE: CPT

## 2023-05-04 PROCEDURE — G0463: CPT

## 2023-05-04 NOTE — OB PROVIDER TRIAGE NOTE - ATTENDING COMMENTS
38yo  at 33w1d who presented for back pain, increased vaginal discharge and leakage of vaginal fluid thought to be physiologic discharge with no s/s amniotic fluid leaking or  labor. Reassuring fetal testing, return precautions discussed.

## 2023-05-04 NOTE — OB PROVIDER TRIAGE NOTE - NSOBPROVIDERNOTE_OBGYN_ALL_OB_FT
Carito Sheppard is a 38yo  at 33w6d who presented for increased vaginal discharge and leakage of vaginal fluid.    #Leakage of vaginal fluid   - On SSE, no pooling of fluid, nitrazine negative, amnisure negative  - Carito Sheppard is a 36yo  at 33w6d who presented for increased vaginal discharge and leakage of vaginal fluid.    #Leakage of vaginal fluid   - On SSE, no pooling of fluid, nitrazine negative, amnisure negative  - On SVE, cervix is closed  - UA negative  - BV panel pending, will follow up and call with results  - NST: reactive  - [place]: intermittently contractile  - At this time, rupture of membranes us not likely  - Discussed return precautions    # 33 week pregnancy  - NST: reactive  - [place] intermittently contractile  - SVE - cervix closed  - Follow up with [facility] Carito Sheppard is a 36yo  at 33w6d who presented for increased vaginal discharge and leakage of vaginal fluid.    #Leakage of vaginal fluid   - On SSE, no pooling of fluid, nitrazine negative, amnisure negative  - On SVE, cervix is closed  - UA negative  - BV panel pending, will follow up and call with results    -Patient will follow up with OBGYN on   Fetus: reactive  [place]: No contractions     Dispo: Home with precautions. Patient was advised to return to the hospital for decreased fetal movement, vaginal bleeding, leakage of fluid, and/or contractions with increased intensity or frequency.     Discussed with Dr. Navas

## 2023-05-04 NOTE — OB PROVIDER TRIAGE NOTE - HISTORY OF PRESENT ILLNESS
Carito Sheppard is a 38yo  at 33w6d who presented for increased vaginal discharge and leakage of vaginal fluid. She reports that this morning she woke up with mid-low back pain that was 8/10 in severity and made it difficult to get up. Over the course of the day her back pain improved and is now just mild discomfort. She also reported nausea starting this morning. She said that this morning around 8am she noted increased white vaginal discharge and fluid. She reports that there has been mild leakage of fluid over the course of the day but has not had any large gush of fluid. She also noted lower abdominal cramps that radiated to the back this morning which lasted around 20 minutes, denies any cramping or contractions currently. Denies any vaginal bleeding or changes in fetal movement. Denies any dysuria, urinary frequency, vomiting, fevers, headaches, or dizziness.     Pregnancy course: Follows with [facility] for PNC. Denies any complications.     POB: : Emergency C section for nonreassuring tracing during labor at 40 weeks, had oligohydramnios - 2020    PGyn: remote history of abnormal paps in her 20s, all normal recently  PMH: Hypothyroidism - now resolved  PSH: C section  Meds: PNV, intermittent ASA 81, Magnesium supplements  Allergies: Cephalosporins (rash), avoids penicillins  Carito Sheppard is a 38yo  at 33w1d who presented for increased vaginal discharge and leakage of vaginal fluid. She reports that this morning she woke up with mid-low back pain that was 8/10 in severity and made it difficult to get up. Over the course of the day her back pain has since improved. She said that this morning around 8am she noted increased white vaginal discharge and fluid. She reports that there has been mild leakage of fluid over the course of the day but has not had any large gush of fluid. She also noted lower abdominal cramps that radiated to the back this morning which lasted around 20 minutes, denies any cramping or contractions currently. Denies urinary symptoms. Denies any vaginal bleeding or changes in fetal movement. Denies any dysuria, urinary frequency, vomiting, fevers, headaches, or dizziness.     Pregnancy course: Follows with [facility] for PNC. Denies any complications.     POB: : Emergency C section for nonreassuring tracing during labor at 40 weeks, had oligohydramnios - 2020    PGyn: remote history of abnormal paps in her 20s, all normal recently  PMH: Hypothyroidism - now resolved  PSH: C-sectionx1  Meds: PNV, intermittent ASA 81  Allergies: Cephalosporins (rash), avoids penicillins

## 2023-05-04 NOTE — OB PROVIDER TRIAGE NOTE - NSHPPHYSICALEXAM_GEN_ALL_CORE
General: NAD, resting comfortably  Abdomen: gravid, nontender to palpation, no CVA tenderness    SSE: No active bleeding, no pooling of fluid in vaginal vault, mild white discharge noted, nitrazine negative, amnisure negative     SVE: 0/0/-3    FHR: Baseline 135, moderate variability, +accels, no decels   [place]: intermittent contractions every 10-12 min Vital Signs Last 24 Hrs  T(C): 37 (04 May 2023 13:06), Max: 37 (04 May 2023 13:06)  T(F): 98.6 (04 May 2023 13:06), Max: 98.6 (04 May 2023 13:06)  HR: 88 (04 May 2023 15:00) (88 - 89)  BP: 122/72 (04 May 2023 15:00) (122/72 - 125/77)  RR: 18 (04 May 2023 13:06) (18 - 18)      General: NAD, resting comfortably  Abdomen: gravid, nontender to palpation, no CVA tenderness  SSE: No active bleeding, no pooling of fluid in vaginal vault, mild white discharge noted  SVE: 0/0/-3  FHR: Baseline 135, moderate variability, +accels, no decels   [place]: intermittent contractions every 10-12 min

## 2023-05-04 NOTE — OB RN TRIAGE NOTE - FALL HARM RISK - UNIVERSAL INTERVENTIONS
Bed in lowest position, wheels locked, appropriate side rails in place/Call bell, personal items and telephone in reach/Instruct patient to call for assistance before getting out of bed or chair/Non-slip footwear when patient is out of bed/[place] to call system/Physically safe environment - no spills, clutter or unnecessary equipment/Purposeful Proactive Rounding/Room/bathroom lighting operational, light cord in reach

## 2023-05-05 LAB
CANDIDA AB TITR SER: SIGNIFICANT CHANGE UP
G VAGINALIS DNA SPEC QL NAA+PROBE: SIGNIFICANT CHANGE UP
T VAGINALIS SPEC QL WET PREP: SIGNIFICANT CHANGE UP

## 2023-05-08 ENCOUNTER — ASOB RESULT (OUTPATIENT)
Age: 38
End: 2023-05-08

## 2023-05-08 ENCOUNTER — NON-APPOINTMENT (OUTPATIENT)
Age: 38
End: 2023-05-08

## 2023-05-08 ENCOUNTER — APPOINTMENT (OUTPATIENT)
Dept: OBGYN | Facility: CLINIC | Age: 38
End: 2023-05-08
Payer: COMMERCIAL

## 2023-05-08 ENCOUNTER — APPOINTMENT (OUTPATIENT)
Dept: ANTEPARTUM | Facility: CLINIC | Age: 38
End: 2023-05-08
Payer: COMMERCIAL

## 2023-05-08 VITALS
BODY MASS INDEX: 30.73 KG/M2 | HEIGHT: 64 IN | WEIGHT: 180 LBS | DIASTOLIC BLOOD PRESSURE: 67 MMHG | SYSTOLIC BLOOD PRESSURE: 109 MMHG

## 2023-05-08 PROCEDURE — 76819 FETAL BIOPHYS PROFIL W/O NST: CPT

## 2023-05-08 PROCEDURE — 76816 OB US FOLLOW-UP PER FETUS: CPT

## 2023-05-08 PROCEDURE — 0502F SUBSEQUENT PRENATAL CARE: CPT

## 2023-05-09 NOTE — OB RN TRIAGE NOTE - NS_SOURCEOFINFO_OBGYN_ALL_OB
Patient Dermal Autograft Text: The defect edges were debeveled with a #15 scalpel blade.  Given the location of the defect, shape of the defect and the proximity to free margins a dermal autograft was deemed most appropriate.  Using a sterile surgical marker, the primary defect shape was transferred to the donor site. The area thus outlined was incised deep to adipose tissue with a #15 scalpel blade.  The harvested graft was then trimmed of adipose and epidermal tissue until only dermis was left.  The skin graft was then placed in the primary defect and oriented appropriately.

## 2023-05-24 ENCOUNTER — APPOINTMENT (OUTPATIENT)
Dept: OBGYN | Facility: CLINIC | Age: 38
End: 2023-05-24
Payer: COMMERCIAL

## 2023-05-24 VITALS
HEIGHT: 64 IN | BODY MASS INDEX: 31.41 KG/M2 | DIASTOLIC BLOOD PRESSURE: 72 MMHG | SYSTOLIC BLOOD PRESSURE: 107 MMHG | WEIGHT: 184 LBS

## 2023-05-24 PROCEDURE — 0502F SUBSEQUENT PRENATAL CARE: CPT

## 2023-05-26 ENCOUNTER — APPOINTMENT (OUTPATIENT)
Dept: ANTEPARTUM | Facility: CLINIC | Age: 38
End: 2023-05-26

## 2023-05-27 LAB — B-HEM STREP SPEC QL CULT: NORMAL

## 2023-05-31 ENCOUNTER — APPOINTMENT (OUTPATIENT)
Dept: OBGYN | Facility: CLINIC | Age: 38
End: 2023-05-31
Payer: COMMERCIAL

## 2023-05-31 VITALS
SYSTOLIC BLOOD PRESSURE: 121 MMHG | BODY MASS INDEX: 31.07 KG/M2 | WEIGHT: 182 LBS | HEIGHT: 64 IN | DIASTOLIC BLOOD PRESSURE: 79 MMHG

## 2023-05-31 PROCEDURE — 0502F SUBSEQUENT PRENATAL CARE: CPT

## 2023-06-01 LAB
HIV1+2 AB SPEC QL IA.RAPID: NONREACTIVE
T PALLIDUM AB SER QL IA: NEGATIVE

## 2023-06-07 ENCOUNTER — APPOINTMENT (OUTPATIENT)
Dept: OBGYN | Facility: CLINIC | Age: 38
End: 2023-06-07
Payer: COMMERCIAL

## 2023-06-07 ENCOUNTER — ASOB RESULT (OUTPATIENT)
Age: 38
End: 2023-06-07

## 2023-06-07 ENCOUNTER — APPOINTMENT (OUTPATIENT)
Dept: ANTEPARTUM | Facility: CLINIC | Age: 38
End: 2023-06-07
Payer: COMMERCIAL

## 2023-06-07 VITALS
WEIGHT: 186 LBS | DIASTOLIC BLOOD PRESSURE: 75 MMHG | SYSTOLIC BLOOD PRESSURE: 117 MMHG | BODY MASS INDEX: 31.76 KG/M2 | HEIGHT: 64 IN

## 2023-06-07 PROCEDURE — 0502F SUBSEQUENT PRENATAL CARE: CPT

## 2023-06-07 PROCEDURE — 76816 OB US FOLLOW-UP PER FETUS: CPT

## 2023-06-07 PROCEDURE — 76819 FETAL BIOPHYS PROFIL W/O NST: CPT

## 2023-06-08 ENCOUNTER — OUTPATIENT (OUTPATIENT)
Dept: OUTPATIENT SERVICES | Facility: HOSPITAL | Age: 38
LOS: 1 days | End: 2023-06-08
Payer: COMMERCIAL

## 2023-06-08 VITALS
SYSTOLIC BLOOD PRESSURE: 119 MMHG | RESPIRATION RATE: 18 BRPM | DIASTOLIC BLOOD PRESSURE: 72 MMHG | TEMPERATURE: 98 F | HEART RATE: 88 BPM

## 2023-06-08 VITALS
HEART RATE: 95 BPM | SYSTOLIC BLOOD PRESSURE: 116 MMHG | DIASTOLIC BLOOD PRESSURE: 69 MMHG | TEMPERATURE: 98 F | RESPIRATION RATE: 18 BRPM

## 2023-06-08 DIAGNOSIS — O47.03 FALSE LABOR BEFORE 37 COMPLETED WEEKS OF GESTATION, THIRD TRIMESTER: ICD-10-CM

## 2023-06-08 DIAGNOSIS — Z98.891 HISTORY OF UTERINE SCAR FROM PREVIOUS SURGERY: Chronic | ICD-10-CM

## 2023-06-08 PROCEDURE — 59025 FETAL NON-STRESS TEST: CPT

## 2023-06-08 PROCEDURE — 83986 ASSAY PH BODY FLUID NOS: CPT

## 2023-06-08 PROCEDURE — G0463: CPT

## 2023-06-08 NOTE — OB PROVIDER TRIAGE NOTE - NSOBPROVIDERNOTE_OBGYN_ALL_OB_FT
A/P: Patient is a  at 38w1d  assessed for r/o labor   - Nitrazine negative, normal physiologic discharge on speculum exam  - Patient is scheduled for a Repeat  delivery on 6/15  Fetus: reactive  [place]: irregular contractions  Dispo: Home with precautions. Patient was advised to return to the hospital for decreased fetal movement, vaginal bleeding, leakage of fluid, and/or contractions with increased intensity or frequency.     Discussed with Dr. Colmenares A/P: Patient is a  at 38w1d  assessed for r/o labor   - Nitrazine negative, normal physiologic discharge on speculum exam  - Patient is scheduled for a Repeat  delivery on 6/15  Fetus: reactive  [place]: irregular contractions  Dispo: Home with precautions. Patient was advised to return to the hospital for decreased fetal movement, vaginal bleeding, leakage of fluid, and/or contractions with increased intensity or frequency.     Discussed with Dr. Colmenares    Addendum:    Subjective Hx, Physical Exam, Laboratory & Imaging results reviewed.  I agree with the Resident Physician's assessment and plan of care, as discussed above.  Call, follow up, and return to Hospital parameters reviewed at length.  She was given the opportunity to ask questions and all were addressed.  Discharge home    Adriel Colmenares, DO

## 2023-06-08 NOTE — OB PROVIDER TRIAGE NOTE - HISTORY OF PRESENT ILLNESS
Patient is a  at 38w1d who presents to L&D for leakage of fluid. She states that today at 1230, she had noted her underwear was wet. She had put a pad on and 30min states that the pad was wet as well. She denies contractions and vaginal bleeding. reports fetal movement. No other complaints at this time.     Pregnancy course uncomplicated    POB: : Emergency C section for nonreassuring tracing during labor at 40 weeks, had oligohydramnios - 2020  PGyn: remote history of abnormal paps in her 20s, all normal recently  PMH: Hypothyroidism - now resolved  PSH: C-sectionx1  Meds: PNV, intermittent ASA 81  Allergies: Cephalosporins (rash), avoids penicillins

## 2023-06-08 NOTE — OB RN TRIAGE NOTE - FALL HARM RISK - UNIVERSAL INTERVENTIONS
Bed in lowest position, wheels locked, appropriate side rails in place/Call bell, personal items and telephone in reach/Instruct patient to call for assistance before getting out of bed or chair/Non-slip footwear when patient is out of bed/[place] to call system/Physically safe environment - no spills, clutter or unnecessary equipment/Purposeful Proactive Rounding/Room/bathroom lighting operational, light cord in reach

## 2023-06-08 NOTE — OB PROVIDER TRIAGE NOTE - PATIENT'S PREFERRED PRONOUN
Her/She patient unable to answer, intubated and sedated child(shlomo)/patient unable to answer, intubated and sedated

## 2023-06-08 NOTE — OB PROVIDER TRIAGE NOTE - NSHPPHYSICALEXAM_GEN_ALL_CORE
T(C): 36.6 (06-08-23 @ 14:37), Max: 36.6 (06-08-23 @ 14:37)  HR: 85 (06-08-23 @ 14:43) (85 - 88)  BP: 119/77 (06-08-23 @ 14:43) (119/72 - 119/77)  RR: 18 (06-08-23 @ 14:37) (18 - 18)    Gen: NAD, well-appearing, resting comfortably on room air   Abd: soft, gravid  Ext: non-edematous, non-tender   SVE: 0/0/-3  SSE: cervix visualized, closed and without any signs of bleeding or drainage, no pooling Normal physiologic discharge  FHT: baseline 130, moderate variability, +accels, -decels   [place]: Irregular contractions

## 2023-06-08 NOTE — OB RN TRIAGE NOTE - GRAVIDA, OB PROFILE
[Home] : at home, [unfilled] , at the time of the visit. [Medical Office: ([facility])___] : at the medical office located in  [Father] : father [FreeTextEntry3] : Father of child [FreeTextEntry6] : Med check appt for ADHD\par Wil is finishing  4th grade, is currently on Methylphenidate 27 mg po qd.  He is doing well on his current dosage as far as focusing and it helps a little for his behavior too.  He has been seeing a behavioral therapist once weekly for the past 2 months which is helping as well.  No new changes in his health, no other medications.  \par No complaints of side effects from the Methylphenidate. 2

## 2023-06-09 PROCEDURE — 99234 HOSP IP/OBS SM DT SF/LOW 45: CPT

## 2023-06-13 ENCOUNTER — OUTPATIENT (OUTPATIENT)
Dept: OUTPATIENT SERVICES | Facility: HOSPITAL | Age: 38
LOS: 1 days | End: 2023-06-13
Payer: COMMERCIAL

## 2023-06-13 DIAGNOSIS — Z01.812 ENCOUNTER FOR PREPROCEDURAL LABORATORY EXAMINATION: ICD-10-CM

## 2023-06-13 DIAGNOSIS — Z98.891 HISTORY OF UTERINE SCAR FROM PREVIOUS SURGERY: Chronic | ICD-10-CM

## 2023-06-13 LAB
ALLERGY+IMMUNOLOGY DIAG STUDY NOTE: SIGNIFICANT CHANGE UP
ANTIBODY TITER RESULT: 0 — SIGNIFICANT CHANGE UP
APTT BLD: 24.6 SEC — LOW (ref 27.5–35.5)
BASOPHILS # BLD AUTO: 0.03 K/UL — SIGNIFICANT CHANGE UP (ref 0–0.2)
BASOPHILS NFR BLD AUTO: 0.3 % — SIGNIFICANT CHANGE UP (ref 0–2)
BLD GP AB SCN SERPL QL: SIGNIFICANT CHANGE UP
COMMENT - BLOOD BANK: SIGNIFICANT CHANGE UP
DIR ANTIGLOB POLYSPECIFIC INTERPRETATION: SIGNIFICANT CHANGE UP
EOSINOPHIL # BLD AUTO: 0.03 K/UL — SIGNIFICANT CHANGE UP (ref 0–0.5)
EOSINOPHIL NFR BLD AUTO: 0.3 % — SIGNIFICANT CHANGE UP (ref 0–6)
HCT VFR BLD CALC: 35.5 % — SIGNIFICANT CHANGE UP (ref 34.5–45)
HGB BLD-MCNC: 11.9 G/DL — SIGNIFICANT CHANGE UP (ref 11.5–15.5)
IMM GRANULOCYTES NFR BLD AUTO: 1 % — HIGH (ref 0–0.9)
INR BLD: 0.93 RATIO — SIGNIFICANT CHANGE UP (ref 0.88–1.16)
LYMPHOCYTES # BLD AUTO: 0.9 K/UL — LOW (ref 1–3.3)
LYMPHOCYTES # BLD AUTO: 9.9 % — LOW (ref 13–44)
MCHC RBC-ENTMCNC: 31.4 PG — SIGNIFICANT CHANGE UP (ref 27–34)
MCHC RBC-ENTMCNC: 33.5 GM/DL — SIGNIFICANT CHANGE UP (ref 32–36)
MCV RBC AUTO: 93.7 FL — SIGNIFICANT CHANGE UP (ref 80–100)
MONOCYTES # BLD AUTO: 0.97 K/UL — HIGH (ref 0–0.9)
MONOCYTES NFR BLD AUTO: 10.7 % — SIGNIFICANT CHANGE UP (ref 2–14)
NEUTROPHILS # BLD AUTO: 7.03 K/UL — SIGNIFICANT CHANGE UP (ref 1.8–7.4)
NEUTROPHILS NFR BLD AUTO: 77.8 % — HIGH (ref 43–77)
PLATELET # BLD AUTO: 260 K/UL — SIGNIFICANT CHANGE UP (ref 150–400)
PREVIOUS TITERED ANTIBODY: SIGNIFICANT CHANGE UP
PROTHROM AB SERPL-ACNC: 10.8 SEC — SIGNIFICANT CHANGE UP (ref 10.5–13.4)
RBC # BLD: 3.79 M/UL — LOW (ref 3.8–5.2)
RBC # FLD: 13.1 % — SIGNIFICANT CHANGE UP (ref 10.3–14.5)
TITERED ANTIBODY: SIGNIFICANT CHANGE UP
WBC # BLD: 9.05 K/UL — SIGNIFICANT CHANGE UP (ref 3.8–10.5)
WBC # FLD AUTO: 9.05 K/UL — SIGNIFICANT CHANGE UP (ref 3.8–10.5)

## 2023-06-13 PROCEDURE — 86077 PHYS BLOOD BANK SERV XMATCH: CPT

## 2023-06-14 ENCOUNTER — TRANSCRIPTION ENCOUNTER (OUTPATIENT)
Age: 38
End: 2023-06-14

## 2023-06-14 PROCEDURE — 86886 COOMBS TEST INDIRECT TITER: CPT

## 2023-06-14 PROCEDURE — 86900 BLOOD TYPING SEROLOGIC ABO: CPT

## 2023-06-14 PROCEDURE — 86870 RBC ANTIBODY IDENTIFICATION: CPT

## 2023-06-14 PROCEDURE — 86905 BLOOD TYPING RBC ANTIGENS: CPT

## 2023-06-14 PROCEDURE — 36415 COLL VENOUS BLD VENIPUNCTURE: CPT

## 2023-06-14 PROCEDURE — 85610 PROTHROMBIN TIME: CPT

## 2023-06-14 PROCEDURE — 85025 COMPLETE CBC W/AUTO DIFF WBC: CPT

## 2023-06-14 PROCEDURE — 86850 RBC ANTIBODY SCREEN: CPT

## 2023-06-14 PROCEDURE — 86922 COMPATIBILITY TEST ANTIGLOB: CPT

## 2023-06-14 PROCEDURE — 85730 THROMBOPLASTIN TIME PARTIAL: CPT

## 2023-06-14 PROCEDURE — 86901 BLOOD TYPING SEROLOGIC RH(D): CPT

## 2023-06-14 PROCEDURE — 86880 COOMBS TEST DIRECT: CPT

## 2023-06-14 RX ORDER — OXYTOCIN 10 UNIT/ML
333.33 VIAL (ML) INJECTION
Qty: 20 | Refills: 0 | Status: DISCONTINUED | OUTPATIENT
Start: 2023-06-15 | End: 2023-06-17

## 2023-06-15 ENCOUNTER — RESULT REVIEW (OUTPATIENT)
Age: 38
End: 2023-06-15

## 2023-06-15 ENCOUNTER — INPATIENT (INPATIENT)
Facility: HOSPITAL | Age: 38
LOS: 1 days | Discharge: HOME | End: 2023-06-17
Attending: OBSTETRICS & GYNECOLOGY | Admitting: OBSTETRICS & GYNECOLOGY
Payer: COMMERCIAL

## 2023-06-15 ENCOUNTER — NON-APPOINTMENT (OUTPATIENT)
Age: 38
End: 2023-06-15

## 2023-06-15 ENCOUNTER — TRANSCRIPTION ENCOUNTER (OUTPATIENT)
Age: 38
End: 2023-06-15

## 2023-06-15 ENCOUNTER — APPOINTMENT (OUTPATIENT)
Dept: OBGYN | Facility: HOSPITAL | Age: 38
End: 2023-06-15

## 2023-06-15 VITALS
SYSTOLIC BLOOD PRESSURE: 128 MMHG | RESPIRATION RATE: 22 BRPM | TEMPERATURE: 99 F | HEART RATE: 102 BPM | WEIGHT: 179.9 LBS | DIASTOLIC BLOOD PRESSURE: 74 MMHG | HEIGHT: 64 IN

## 2023-06-15 DIAGNOSIS — O34.211 MATERNAL CARE FOR LOW TRANSVERSE SCAR FROM PREVIOUS CESAREAN DELIVERY: ICD-10-CM

## 2023-06-15 DIAGNOSIS — Z98.891 HISTORY OF UTERINE SCAR FROM PREVIOUS SURGERY: Chronic | ICD-10-CM

## 2023-06-15 LAB
BLD GP AB SCN SERPL QL: SIGNIFICANT CHANGE UP
DIR ANTIGLOB POLYSPECIFIC INTERPRETATION: SIGNIFICANT CHANGE UP
T PALLIDUM AB TITR SER: NEGATIVE — SIGNIFICANT CHANGE UP

## 2023-06-15 PROCEDURE — 59510 CESAREAN DELIVERY: CPT

## 2023-06-15 PROCEDURE — 59514 CESAREAN DELIVERY ONLY: CPT | Mod: 80

## 2023-06-15 PROCEDURE — 88304 TISSUE EXAM BY PATHOLOGIST: CPT | Mod: 26

## 2023-06-15 RX ORDER — FAMOTIDINE 10 MG/ML
20 INJECTION INTRAVENOUS ONCE
Refills: 0 | Status: COMPLETED | OUTPATIENT
Start: 2023-06-15 | End: 2023-06-15

## 2023-06-15 RX ORDER — CITRIC ACID/SODIUM CITRATE 300-500 MG
30 SOLUTION, ORAL ORAL ONCE
Refills: 0 | Status: COMPLETED | OUTPATIENT
Start: 2023-06-15 | End: 2023-06-15

## 2023-06-15 RX ORDER — OXYCODONE HYDROCHLORIDE 5 MG/1
5 TABLET ORAL
Refills: 0 | Status: DISCONTINUED | OUTPATIENT
Start: 2023-06-15 | End: 2023-06-17

## 2023-06-15 RX ORDER — SIMETHICONE 80 MG/1
80 TABLET, CHEWABLE ORAL EVERY 4 HOURS
Refills: 0 | Status: DISCONTINUED | OUTPATIENT
Start: 2023-06-15 | End: 2023-06-17

## 2023-06-15 RX ORDER — ACETAMINOPHEN 500 MG
975 TABLET ORAL ONCE
Refills: 0 | Status: COMPLETED | OUTPATIENT
Start: 2023-06-15 | End: 2023-06-15

## 2023-06-15 RX ORDER — GENTAMICIN SULFATE 40 MG/ML
405 VIAL (ML) INJECTION ONCE
Refills: 0 | Status: COMPLETED | OUTPATIENT
Start: 2023-06-15 | End: 2023-06-15

## 2023-06-15 RX ORDER — TETANUS TOXOID, REDUCED DIPHTHERIA TOXOID AND ACELLULAR PERTUSSIS VACCINE, ADSORBED 5; 2.5; 8; 8; 2.5 [IU]/.5ML; [IU]/.5ML; UG/.5ML; UG/.5ML; UG/.5ML
0.5 SUSPENSION INTRAMUSCULAR ONCE
Refills: 0 | Status: DISCONTINUED | OUTPATIENT
Start: 2023-06-15 | End: 2023-06-17

## 2023-06-15 RX ORDER — SODIUM CHLORIDE 9 MG/ML
1000 INJECTION, SOLUTION INTRAVENOUS
Refills: 0 | Status: DISCONTINUED | OUTPATIENT
Start: 2023-06-15 | End: 2023-06-17

## 2023-06-15 RX ORDER — ACETAMINOPHEN 500 MG
975 TABLET ORAL
Refills: 0 | Status: DISCONTINUED | OUTPATIENT
Start: 2023-06-15 | End: 2023-06-17

## 2023-06-15 RX ORDER — MAGNESIUM HYDROXIDE 400 MG/1
30 TABLET, CHEWABLE ORAL
Refills: 0 | Status: DISCONTINUED | OUTPATIENT
Start: 2023-06-15 | End: 2023-06-17

## 2023-06-15 RX ORDER — SODIUM CHLORIDE 9 MG/ML
1000 INJECTION, SOLUTION INTRAVENOUS
Refills: 0 | Status: DISCONTINUED | OUTPATIENT
Start: 2023-06-15 | End: 2023-06-15

## 2023-06-15 RX ORDER — OXYTOCIN 10 UNIT/ML
333.33 VIAL (ML) INJECTION
Qty: 20 | Refills: 0 | Status: DISCONTINUED | OUTPATIENT
Start: 2023-06-15 | End: 2023-06-17

## 2023-06-15 RX ORDER — SCOPALAMINE 1 MG/3D
1 PATCH, EXTENDED RELEASE TRANSDERMAL ONCE
Refills: 0 | Status: COMPLETED | OUTPATIENT
Start: 2023-06-15 | End: 2023-06-15

## 2023-06-15 RX ORDER — KETOROLAC TROMETHAMINE 30 MG/ML
30 SYRINGE (ML) INJECTION EVERY 6 HOURS
Refills: 0 | Status: DISCONTINUED | OUTPATIENT
Start: 2023-06-15 | End: 2023-06-16

## 2023-06-15 RX ORDER — SODIUM CHLORIDE 9 MG/ML
1000 INJECTION, SOLUTION INTRAVENOUS ONCE
Refills: 0 | Status: COMPLETED | OUTPATIENT
Start: 2023-06-15 | End: 2023-06-15

## 2023-06-15 RX ORDER — IBUPROFEN 200 MG
600 TABLET ORAL EVERY 6 HOURS
Refills: 0 | Status: COMPLETED | OUTPATIENT
Start: 2023-06-15 | End: 2024-05-13

## 2023-06-15 RX ORDER — DIPHENHYDRAMINE HCL 50 MG
25 CAPSULE ORAL EVERY 6 HOURS
Refills: 0 | Status: DISCONTINUED | OUTPATIENT
Start: 2023-06-15 | End: 2023-06-17

## 2023-06-15 RX ORDER — LANOLIN
1 OINTMENT (GRAM) TOPICAL EVERY 6 HOURS
Refills: 0 | Status: DISCONTINUED | OUTPATIENT
Start: 2023-06-15 | End: 2023-06-17

## 2023-06-15 RX ADMIN — SCOPALAMINE 1 PATCH: 1 PATCH, EXTENDED RELEASE TRANSDERMAL at 18:36

## 2023-06-15 RX ADMIN — Medication 975 MILLIGRAM(S): at 07:09

## 2023-06-15 RX ADMIN — Medication 30 MILLILITER(S): at 06:39

## 2023-06-15 RX ADMIN — Medication 975 MILLIGRAM(S): at 12:08

## 2023-06-15 RX ADMIN — Medication 975 MILLIGRAM(S): at 06:39

## 2023-06-15 RX ADMIN — Medication 30 MILLIGRAM(S): at 15:20

## 2023-06-15 RX ADMIN — Medication 975 MILLIGRAM(S): at 12:47

## 2023-06-15 RX ADMIN — SCOPALAMINE 1 PATCH: 1 PATCH, EXTENDED RELEASE TRANSDERMAL at 07:30

## 2023-06-15 RX ADMIN — Medication 100 MILLIGRAM(S): at 08:30

## 2023-06-15 RX ADMIN — Medication 975 MILLIGRAM(S): at 18:39

## 2023-06-15 RX ADMIN — Medication 30 MILLIGRAM(S): at 21:05

## 2023-06-15 RX ADMIN — Medication 30 MILLIGRAM(S): at 15:57

## 2023-06-15 RX ADMIN — FAMOTIDINE 20 MILLIGRAM(S): 10 INJECTION INTRAVENOUS at 06:39

## 2023-06-15 RX ADMIN — Medication 975 MILLIGRAM(S): at 23:52

## 2023-06-15 RX ADMIN — SODIUM CHLORIDE 125 MILLILITER(S): 9 INJECTION, SOLUTION INTRAVENOUS at 09:08

## 2023-06-15 RX ADMIN — SODIUM CHLORIDE 2000 MILLILITER(S): 9 INJECTION, SOLUTION INTRAVENOUS at 06:40

## 2023-06-15 RX ADMIN — Medication 100 MILLIGRAM(S): at 08:10

## 2023-06-15 RX ADMIN — SCOPALAMINE 1 PATCH: 1 PATCH, EXTENDED RELEASE TRANSDERMAL at 06:39

## 2023-06-15 RX ADMIN — Medication 1000 MILLIUNIT(S)/MIN: at 09:00

## 2023-06-15 NOTE — OB PROVIDER H&P - HISTORY OF PRESENT ILLNESS
Patient is a 38 year old  at 39w1d who presents to L&D for rCS.          AGATHA: 23     LMP: 22          Pregnancy course:     Uncomplicated           POB:      G1 (2020) stat pCS secondary to NRFHT during 40w IOL oligohydramnios     PGyn: remote history of abnormal paps in her 20s, all normal recently, denies STIs     PMH: Hx hypothyroidism resolved     PSH: C/Sx1     Meds: PNV, ASA     Allergies: Cephalosporins (rash), avoids penicillins     Social Hx: Denies EtOH, tobacco and illicit drug use during this pregnancy; feels safe at home          Ultrasound: vtx, posterior     EFW: 3388g, 64%ile on   Patient is a 38 year old  at 39w1d who presents to L&D for rCS.  No complaints today.           AGATHA: 23     LMP: 22          Pregnancy course:     Uncomplicated           POB:      G1 (2020) stat pCS secondary to NRFHT during 40w IOL oligohydramnios     PGyn: remote history of abnormal paps in her 20s, all normal recently, denies STIs     PMH: Hx hypothyroidism resolved     PSH: C/Sx1     Meds: PNV, ASA     Allergies: Cephalosporins (rash), avoids penicillins     Social Hx: Denies EtOH, tobacco and illicit drug use during this pregnancy; feels safe at home          Ultrasound: vtx, posterior     EFW: 3388g, 64%ile on   Patient is a 38 year old  at 39w1d who presents to L&D for rCS.  No complaints today.  Denies contractions, vaginal bleeding, and leakage of fluid. Reports fetal movement. No other complaints at this time.     AGATHA: 23   LMP 22     Pregnancy course:   Uncomplicated     POB:    G1 (2020) stat pCS secondary to NRFHT during 40w IOL oligohydramnios   PGyn: remote history of abnormal paps in her 20s, all normal recently, denies STIs   PMH: Hx hypothyroidism resolved   PSH: C/Sx1   Meds: PNV, ASA   Allergies: Cephalosporins (rash), avoids penicillins   Social Hx: Denies EtOH, tobacco and illicit drug use during this pregnancy; feels safe at home     Ultrasound: vtx, posterior   EFW: 3388g, 64%ile on

## 2023-06-15 NOTE — OB RN DELIVERY SUMMARY - NSSELHIDDEN_OBGYN_ALL_OB_FT
[NS_DeliveryAttending1_OBGYN_ALL_OB_FT:[id number]=],[NS_DeliveryAttending2_OBGYN_ALL_OB_FT:[email or web address]=],[NS_DeliveryRN_OBGYN_ALL_OB_FT:[id number]==]

## 2023-06-15 NOTE — OB PROVIDER H&P - ATTENDING COMMENTS
38 year old female  at 39 weeks for repeat  section.  Medical history reviewed.  VSS.  FHT reassuring.  Labs reviewed.  Plan for repeat  section.  Patient aware of risks of bleeding (and blood transfusion with subsequent risks), infection and injury to adjacent organs including but not limited to the bladder, bowel or ureter.  Informed consent obtained.  The opportunity was given to ask questions which were answered to their satisfaction.

## 2023-06-15 NOTE — DISCHARGE NOTE OB - HOSPITAL COURSE
rCS @39w, uncomplicated. Postpartum pain is well controlled with PRN medication. She has no difficulty with ambulation, voiding or PO intake. Lab values and vital signs are within normal limits prior to discharge.

## 2023-06-15 NOTE — DISCHARGE NOTE OB - MEDICATION SUMMARY - MEDICATIONS TO TAKE
I will START or STAY ON the medications listed below when I get home from the hospital:    ibuprofen 600 mg oral tablet  -- 1 tab(s) by mouth every 6 hours  -- Indication: For Pain    acetaminophen 500 mg oral tablet  -- 2 tab(s) by mouth every 6 hours  -- Indication: For Pain    oxyCODONE 5 mg oral tablet  -- 1 tab(s) by mouth every 6 hours MDD: 4  -- Indication: For Severe pain    Prenatal Multivitamins with Folic Acid 1 mg oral tablet  -- 1 tab(s) by mouth once a day  -- Indication: For Healthy mom and baby

## 2023-06-15 NOTE — OB PROVIDER DELIVERY SUMMARY - NSPROVIDERDELIVERYNOTE_OBGYN_ALL_OB_FT
Repeat  section performed, no complications, see op note for details.  Viable female infant born at 08:38.  APGAR 9/9.  Weight 7lbs 12 oz.  Patient and infant stable.

## 2023-06-15 NOTE — DISCHARGE NOTE OB - CARE PLAN
1 Principal Discharge DX:	S/P  section  Assessment and plan of treatment:	Patient underwent a  delivery. Post-op course was uncomplicated. Pain is well controlled with PRN medication. She has no difficulty with ambulation, voiding, or PO intake. Lab values and vital signs are within normal limits prior to discharge.  1) Please take acetaminophen and ibuprofen as needed for pain as prescribed.  2) Nothing in the vagina for 6 weeks (including no sex, no tampons, and no douching).  3) Please call your doctor for a follow up incision check in 2 weeks and postpartum appointment in 4-6 weeks.  4) Please continue taking vitamins postpartum. Take iron and colace for acute blood loss anemia.  5) Please call the office sooner if you have heavy vaginal bleeding, severe abdominal pain, or fever > 100.4F.  6) You may resume regular daily activity as tolerated

## 2023-06-15 NOTE — DISCHARGE NOTE OB - PLAN OF CARE
Patient underwent a  delivery. Post-op course was uncomplicated. Pain is well controlled with PRN medication. She has no difficulty with ambulation, voiding, or PO intake. Lab values and vital signs are within normal limits prior to discharge.  1) Please take acetaminophen and ibuprofen as needed for pain as prescribed.  2) Nothing in the vagina for 6 weeks (including no sex, no tampons, and no douching).  3) Please call your doctor for a follow up incision check in 2 weeks and postpartum appointment in 4-6 weeks.  4) Please continue taking vitamins postpartum. Take iron and colace for acute blood loss anemia.  5) Please call the office sooner if you have heavy vaginal bleeding, severe abdominal pain, or fever > 100.4F.  6) You may resume regular daily activity as tolerated

## 2023-06-15 NOTE — OB RN PATIENT PROFILE - FALL HARM RISK - UNIVERSAL INTERVENTIONS
Bed in lowest position, wheels locked, appropriate side rails in place/Call bell, personal items and telephone in reach/Instruct patient to call for assistance before getting out of bed or chair/Non-slip footwear when patient is out of bed/[place] to call system/Physically safe environment - no spills, clutter or unnecessary equipment/Purposeful Proactive Rounding/Room/bathroom lighting operational, light cord in reach

## 2023-06-15 NOTE — OB PROVIDER H&P - NSHPPHYSICALEXAM_GEN_ALL_CORE
Vital Signs Last 24 Hrs  T(C): 37.0 (15 Neo 2023 06:11), Max: 37.1 (15 Neo 2023 06:00)  T(F): 98.6 (15 Neo 2023 06:11), Max: 98.8 (15 Neo 2023 06:00)  HR: 102 (15 Neo 2023 06:11) (102 - 102)  BP: 128/74 (15 Neo 2023 06:11) (128/74 - 128/74)  RR: 20 (15 Neo 2023 06:11) (20 - 22)    Gen: AAOx3  Abd: soft, gravid  Ext: no tenderness to calf palpation  FHT: baseline 140, moderate variability, +accels, -decels  [place]: irregular contractions

## 2023-06-15 NOTE — OB RN INTRAOPERATIVE NOTE - NSSELHIDDEN_OBGYN_ALL_OB_FT
[NS_DeliveryAttending1_OBGYN_ALL_OB_FT:[id number]=],[NS_DeliveryAttending2_OBGYN_ALL_OB_FT:[email or web address]=],[NS_DeliveryRN_OBGYN_ALL_OB_FT:[id number]]

## 2023-06-15 NOTE — DISCHARGE NOTE OB - NS MD DC FALL RISK RISK
For information on Fall & Injury Prevention, visit: [email or web address] OR  [email or web address] OR  https://www.cdc.gov/steadi/patient.html

## 2023-06-15 NOTE — OB PROVIDER DELIVERY SUMMARY - NSSELHIDDEN_OBGYN_ALL_OB_FT
[NS_DeliveryAttending1_OBGYN_ALL_OB_FT:[id number]=],[NS_DeliveryAttending2_OBGYN_ALL_OB_FT:[email or web address]=],[NS_DeliveryRN_OBGYN_ALL_OB_FT:[id number]]

## 2023-06-15 NOTE — OB RN DELIVERY SUMMARY - NS_DELIVERYROOM_OBGYN_ALL_OB_FT
This note was copied from a baby's chart. Mother and father eating breakfast.  Mother states she formula fed over night. Mother states she will breastfeed again today. Discharge and engorgement info reviewed. Talked to parents about doing both breast and formula, returning to work, and shield use. Chart shows numerous feedings, void, stool WNL. Discussed importance of monitoring outputs and feedings on first week of life. Discussed ways to tell if baby is  getting enough breast milk, ie  voids and stools, change in color of stool, and return to birth wt within 2 weeks. Follow up with pediatrician visit for weight check in 1-2 days (per AAP guidelines.)  Encouraged to call [facility]  [phone] or [facility] at [phone] for any questions/problems that arise. Mother also given breastfeeding support group dates and times for any future needs    Engorgement Care Guidelines:  Reviewed how milk is made and normal phases of milk production. Taught care of engorged breasts - frequent breastfeeding encouraged, cool packs and motrin as tolerated. Anticipatory guidance shared. Pt will successfully establish breastfeeding by feeding in response to early feeding cues   or wake every 3h, will obtain deep latch, and will keep log of feedings/output. Taught to BF at hunger cues and or q 2-3 hrs and to offer 10-20 drops of hand expressed colostrum at any non-feeds.       Breast Assessment  Left Breast: Large, Extra large  Left Nipple: Flat, Intact  Right Breast: Large, Extra large  Right Nipple: Flat, Intact  Breast- Feeding Assessment  Attends Breast-Feeding Classes: No  Breast-Feeding Experience: No  Breast Trauma/Surgery: No  Type/Quality: Fair  Lactation Consultant Visits  Breast-Feedings:  (mother formula fed over night)  Mother/Infant Observation  Mother Observation: Breast comfortable, Close hold, Holds breast  Infant Observation: Lips flanged, lower, Lips flanged, upper, Opens mouth, Rhythmic suck  LATCH Documentation  Latch:  (did not see baby at breast)  Audible Swallowing: A few with stimulation  Type of Nipple: Flat (using shield)  Comfort (Breast/Nipple): Soft/non-tender  Hold (Positioning): No assist from staff, mother able to position/hold infant  LATCH Score: 8 OR 1

## 2023-06-15 NOTE — OB RN INTRAOPERATIVE NOTE - NS_IMPLANTS_OBGYN_ALL_OB
PPD#1    S: Patient doing well. Minimal lochia. Pain controlled.    O: Vital Signs Last 24 Hrs  T(C): 36.8 (07 Aug 2021 05:00), Max: 36.8 (06 Aug 2021 18:00)  T(F): 98.2 (07 Aug 2021 05:00), Max: 98.2 (06 Aug 2021 18:00)  HR: 68 (07 Aug 2021 05:00) (68 - 74)  BP: 97/62 (07 Aug 2021 05:00) (97/62 - 121/77)  BP(mean): --  RR: 17 (07 Aug 2021 05:00) (16 - 18)  SpO2: 97% (07 Aug 2021 05:00) (97% - 98%)    Gen: NAD  Abd: soft, NT, ND, fundus firm below umbilicus  Lochia: moderate  Ext: no tenderness    Labs:                        14.2   9.12  )-----------( 161      ( 06 Aug 2021 03:15 )             41.8       A: 28y PPD# s/p  doing well.    Plan:  Analgesia prn  Regular diet  Follow up am labs PPD#1    S: Patient doing well. Minimal lochia. Pain controlled.    O: Vital Signs Last 24 Hrs  T(C): 36.8 (07 Aug 2021 05:00), Max: 36.8 (06 Aug 2021 18:00)  T(F): 98.2 (07 Aug 2021 05:00), Max: 98.2 (06 Aug 2021 18:00)  HR: 68 (07 Aug 2021 05:00) (68 - 74)  BP: 97/62 (07 Aug 2021 05:00) (97/62 - 121/77)  BP(mean): --  RR: 17 (07 Aug 2021 05:00) (16 - 18)  SpO2: 97% (07 Aug 2021 05:00) (97% - 98%)    Gen: NAD  Abd: soft, NT, ND, fundus firm below umbilicus  Lochia: moderate  Ext: no tenderness    Labs:                        14.2   9.12  )-----------( 161      ( 06 Aug 2021 03:15 )             41.8       A: 28y PPD# s/p  doing well.    Plan:  Analgesia prn  Regular diet  Follow up am labs  d/c home None

## 2023-06-15 NOTE — OB RN DELIVERY SUMMARY - NS_SEPSISRSKCALC_OBGYN_ALL_OB_FT
EOS calculated successfully. EOS Risk Factor: 0.5/1000 live births ([place] national incidence); GA=39w1d; Temp=98.8; ROM=0.033; GBS='Negative'; Antibiotics='No antibiotics or any antibiotics < 2 hrs prior to birth'

## 2023-06-15 NOTE — OB RN PATIENT PROFILE - FUNCTIONAL SCREEN CURRENT LEVEL: COMMUNICATION, MLM
Routing refill request to provider for review/approval because:  Drug not on the [facility] refill protocol        0 = understands/communicates without difficulty

## 2023-06-15 NOTE — DISCHARGE NOTE OB - CARE PROVIDER_API CALL
Adriel Colmenares  Obstetrics and Gynecology  [address], [address]  [place] [address]  Phone: [phone]  Fax: [phone]  Follow Up Time: 2 weeks

## 2023-06-15 NOTE — OB PROVIDER H&P - ASSESSMENT
38 year old female  at 39 weeks for repeat CS    1. Admit to L&D  2. Routine labs A/P 38 year old female  at 39 weeks admitted for repeat CS    -Admit to L&D  -Consent  -Admission labs  -NPO  -IV fluids  -Fetus: Cat I tracing. Continuous toco and fetal monitoring.   -GBS: Negative, no GBS ppx required   -Analgesia: prn    Discussed with Dr. Patterson

## 2023-06-15 NOTE — OB RN PATIENT PROFILE - NSRUBEOLADATE_OBGYN_ALL_OB
Chief Complaint   Patient presents with   • Video Visit     F/u    • Diabetes Mellitus     T2DM   Fbg 129   Smbg 2-3 times a day depending on when she eats   Eye exam due        The visit was performed via real-time, 2 way audio visual technology    Clinician Location: clinic in [place], [place]  Patient Location: work in [place]    Reason for video visit: follow up        Ms. Ankur Aviles is a 20 year old female  with PMHx of Vit D Deficiency, DM type 2, depression, anxiety, tachycardia presenting for follow up and management of DM type 2. Last seen in 2022.  per pt. As of recent, has seen higher sugars towards the end of the week. BG better in the beginning of the week around the time she takes Trulicity. , sometimes in 200s. On Monday's, BG around 129 after dinner. On Saturday, had BG of 210- had greasy food. Lowest BG around 109. Denies hypoglycemia. Watching her diet. SMB-3x daily. Has been doing well. Started on Trulicity per Dr. Daniels. Taking Lantus 10 U. Taking HL before her meals.  Doesn't have CGM. At work, doesn't have glucometer. Pt has IUD and doesn't get periods.      The pt denies polyuria or polydipsia. Pt does not have numbness and tingling in their feet. No abdominal complaints. No CP or SOB. The pt denies depression and anxiety. No blurry vision. No issues with the feet.        General: no fatigue, no fever, no weight loss, no appetite changes   Resp: no dyspnea, no cough   GI: no abdominal pain, no diarrhea, no nausea, no vomiting       Vitals  There were no vitals taken for this visit.    Physical Exam  General: No apparent distress, looks comfortable    Discussion/Summary  Endocrine Impression: 20 year old year old female  with PMHx of Vit D Deficiency, DM type 2, depression, anxiety, tachycardia presenting for follow up and management of DM type 2. Previously f/u with pediatric endocrinologist, Dr. Nanette Gracia, last seen in .  DM DX in . Dx with prediabetes at  age 14. Started on insulin when pregnant at age 18. Has been on insulin since pregnancy.  Last delivered in 8/2021. She lives with her daughter. Pt is a  at a hotel. Daughter is 2 years old.     DM type 2 uncontrolled with strong FHx of DM  Lab Results   Component Value Date    HGBA1C 10.3 (H) 10/04/2022    HGBA1C 6.7 (H) 06/10/2021    CREATININE 0.50 (L) 01/17/2023    GFRESTIMATE >90 01/17/2023    TSH 1.643 01/17/2023    MALBCR 22.3 10/04/2022    GADAB <5.0 01/17/2023    HCGQUAL Negative 01/17/2023     HGB (g/dL)   Date Value   08/17/2021 8.9 (L)   - Regimen: Lantus 10 U daily, Humalog 10 U TID with meals, Trulicity 0.75 mg weekly  -  per pt. As of recent, has seen higher sugars towards the end of the week. BG better in the beginning of the week around the time she takes Trulicity. , sometimes in 200s. On Monday's, BG around 129 after dinner. On Saturday, had BG of 210- had greasy food. Lowest BG around 109. Denies hypoglycemia.   - strictly advised to avoid pregnancy as pt on Trulicity  - Taking HL before her meals  - Discussed about adding Metformin if DM doesn't improve, would likely help decrease insulin requirement. Previously on Metformin. Pt agreeable to add metformin. Start Metformin  mg one pill daily with dinner for 1-2 weeks. Then increase to two pills daily with dinner.   - Once Metformin started, Decrease Humalog to 5 U three times daily with meals only if sugar over 120. continue same lantus.   - Discussed Trulicity and rare side effects of pancreatitis. Tolerating Trulicity. Has been on for >4 weeks. Increase Trulicity to 1.5 mg weekly  - Recommend yearly eye exam to screen for retinopathy.  - Pt has Mirena IUD, doesn't get periods. no plans for pregnancy. Discussed need to avoid pregnancy until diabetes is well controlled.  - Eye exam from 7/2022 reported no diabetic retinopathy  - Pt is in follow up with PharmD, Dr. Daniels, continue to follow up. Last seen in 4/2023.  Pt has an appointment on 6/26/2023  - Continue healthy diet and lifestyle modifications  - check A1c, c peptide      Thyroid  - US of thyroid from 1/2023: Normal thyroid ultrasound.  Lab Results   Component Value Date    TSH 1.643 01/17/2023         Lab Results   Component Value Date    MALBCR 22.3 10/04/2022    POTASSIUM 3.8 01/17/2023       DLD associated with DM type 2  Lab Results   Component Value Date    CHOLESTEROL 163 01/17/2023    CALCLDL 112 01/17/2023    HDL 36 (L) 01/17/2023    TRIGLYCERIDE 73 01/17/2023     Lab Results   Component Value Date    ALKPT 131 (H) 01/17/2023    AST 14 01/17/2023    GPT 27 01/17/2023   - Continue dietary modifications      I thank Dr. Abisai Austin  for allowing me to participate in the care of the patient If any of the questions are unanswered please don't hesitate to give me a call.    Plan  Management Plan and Instructions:        Rotate injection sites, Limit the amount of sugar sweetened drinks like soda pop and juice. , For Blood Glucose < 80 mg/dl treat with 4 ounces of juice or 4 glucose tablets (15g). Recheck Blood Glucose in 15 minutes. Repeat until blood glucose is > 80. , Continue lifestyle modifications, Call if sugars consistently less than 70.   and Bring your sugar log and meter to each visit    - Recommend yearly eye exam to screen for retinopathy.  - Start Metformin  mg one pill daily with dinner for 1-2 weeks. Then increase to two pills daily with dinner.   - Increase Trulicity to 1.5 mg weekly  - Continue Lantus 10 U daily  - Once Metformin started, Decrease Humalog to 5 U three times daily with meals only if sugar over 120. Do not take Humalog if sugar less than 120 before your meals.  - Continue to follow up with PharmD, Dr. Daniels  - Call the office or send a message through the portal if blood sugars are under 70 or consistently over 300  - Limit carb intake of things like rice, pasta, potatoes, and bread. Avoid sugary foods and drinks like juice  and soda  - Complete blood work as per orders at an ACL lab, schedule an appointment for blood work around 8 am, fast for 8 hours prior to blood draw. Please hold all medications the morning of labs. You can take them after labs are drawn  - Follow up in 4-5 months    Call lasted: 12 minutes      Scribe Attestation:  On 5/23/2023, George HOPKINSey Cristina scribed the services personally performed by Rea Dodson MD MD Attestation:  The documentation recorded by the scribe accurately and completely reflects the service(s) I personally performed and the decisions made by me.               Allergies  ALLERGIES:  No Known Allergies    MEDICATIONS  Current Outpatient Medications   Medication Sig Dispense Refill   • dulaglutide (Trulicity) 0.75 MG/0.5ML pen-injector Inject 0.75 mg into the skin every 7 days. 2 mL 5   • OneTouch Verio test strip Test blood sugar 3 times daily. Diagnosis: e11.65. Meter: one touch verio 300 strip 3   • sertraline (ZOLOFT) 25 MG tablet Take 25 mg by mouth daily.     • insulin glargine (Lantus SoloStar) 100 UNIT/ML pen-injector 5 units daily . Prime 2 units before each dose. 15 mL 3   • Blood Glucose Monitoring Suppl (OneTouch Verio) w/Device Kit 1 each 3 times daily. 1 kit 0   • OneTouch Delica Lancets 33G Misc Use to check blood sugar 3x a day 300 each 3   • Insulin Pen Needle (TRUEplus 5-Bevel Pen Needles) 32G X 4 MM Misc Use to inject insulin 4 times daily. Remove needle cover(s) to expose needle before injecting. 400 each 3     No current facility-administered medications for this visit.       HISTORIES  Active Problems  Patient Active Problem List   Diagnosis   • Vitamin D deficiency   • Acute low back pain without sciatica   • Type 2 diabetes mellitus with hyperglycemia, without long-term current use of insulin (CMD)   • IUD (intrauterine device) in place   • Depressive disorder   • Dyslipidemia       Past Medical History  Past Medical History:   Diagnosis Date   • Anxiety    • Depressive  disorder    • Diabetes mellitus, type 2 (CMD)    • Lump or mass in breast 2021    Both breasts   • Postpartum depression 5/3/2022   • Tachycardia 2021    Intermittent tachycardia as high as 130s this pregnancy EKG sinus tachycardia TSH WNL [ ] cards consult       Surgical History  Past Surgical History:   Procedure Laterality Date   •  section, low transverse         Family History  Family History   Problem Relation Age of Onset   • Diabetes Mother    • Hypertension Mother    • Stroke Mother    • Patient is unaware of any medical problems Father    • Diabetes Maternal Grandmother    • Hypertension Maternal Grandmother    • Diabetes Paternal Grandmother    • Stroke Paternal Grandmother    • Diabetes Maternal Grandfather    • Cancer Paternal Grandfather    • Diabetes Paternal Grandfather        Social History  Social History     Socioeconomic History   • Marital status: Single     Spouse name: boyfrienmio Rodriguez   • Number of children: Not on file   • Years of education: Not on file   • Highest education level: Not on file   Occupational History   • Occupation: unemployed   Tobacco Use   • Smoking status: Former     Current packs/day: 0.00   • Smokeless tobacco: Never   Vaping Use   • Vaping Use: never used   Substance and Sexual Activity   • Alcohol use: Not Currently   • Drug use: Not Currently     Frequency: 5.0 times per week     Types: Marijuana     Comment: , quit with pregnancy   • Sexual activity: Yes     Partners: Male     Birth control/protection: I.U.D.   Other Topics Concern   • Not on file   Social History Narrative   • Not on file     Social Determinants of Health     Financial Resource Strain: Low Risk  (2021)    Financial Resource Strain    • Social Determinants: Financial Resource Strain: None   Food Insecurity: No Food Insecurity (2021)    Food Insecurity    • Social Determinants: Food Insecurity: Never   Transportation Needs: No Transportation Needs (2021)     PRAPARE - Transportation    • Lack of Transportation (Medical): No    • Lack of Transportation (Non-Medical): No   Physical Activity: Not on file   Stress: Not on file   Social Connections: Not on file   Intimate Partner Violence: Not At Risk (8/14/2021)    Intimate Partner Violence    • Social Determinants: Intimate Partner Violence Past Fear: No    • Social Determinants: Intimate Partner Violence Current Fear: No       Review  Past medical history, problem list, family medical history, surgical history and social history reviewed.                    29-Nov-2022

## 2023-06-15 NOTE — DISCHARGE NOTE OB - PATIENT PORTAL LINK FT
You can access the FollowMyHealth Patient Portal offered by [facility] by registering at the following website: [email or web address]. By joining [employer]’s FollowMyHealth portal, you will also be able to view your health information using other applications (apps) compatible with our system.

## 2023-06-16 LAB
BASOPHILS # BLD AUTO: 0.03 K/UL — SIGNIFICANT CHANGE UP (ref 0–0.2)
BASOPHILS NFR BLD AUTO: 0.2 % — SIGNIFICANT CHANGE UP (ref 0–2)
EOSINOPHIL # BLD AUTO: 0.07 K/UL — SIGNIFICANT CHANGE UP (ref 0–0.5)
EOSINOPHIL NFR BLD AUTO: 0.5 % — SIGNIFICANT CHANGE UP (ref 0–6)
HCT VFR BLD CALC: 31 % — LOW (ref 34.5–45)
HGB BLD-MCNC: 10.1 G/DL — LOW (ref 11.5–15.5)
IMM GRANULOCYTES NFR BLD AUTO: 0.4 % — SIGNIFICANT CHANGE UP (ref 0–0.9)
LYMPHOCYTES # BLD AUTO: 17.6 % — SIGNIFICANT CHANGE UP (ref 13–44)
LYMPHOCYTES # BLD AUTO: 2.25 K/UL — SIGNIFICANT CHANGE UP (ref 1–3.3)
MCHC RBC-ENTMCNC: 31.7 PG — SIGNIFICANT CHANGE UP (ref 27–34)
MCHC RBC-ENTMCNC: 32.6 GM/DL — SIGNIFICANT CHANGE UP (ref 32–36)
MCV RBC AUTO: 97.2 FL — SIGNIFICANT CHANGE UP (ref 80–100)
MONOCYTES # BLD AUTO: 1.55 K/UL — HIGH (ref 0–0.9)
MONOCYTES NFR BLD AUTO: 12.1 % — SIGNIFICANT CHANGE UP (ref 2–14)
NEUTROPHILS # BLD AUTO: 8.81 K/UL — HIGH (ref 1.8–7.4)
NEUTROPHILS NFR BLD AUTO: 69.2 % — SIGNIFICANT CHANGE UP (ref 43–77)
PLATELET # BLD AUTO: 208 K/UL — SIGNIFICANT CHANGE UP (ref 150–400)
RBC # BLD: 3.19 M/UL — LOW (ref 3.8–5.2)
RBC # FLD: 13.3 % — SIGNIFICANT CHANGE UP (ref 10.3–14.5)
WBC # BLD: 12.76 K/UL — HIGH (ref 3.8–10.5)
WBC # FLD AUTO: 12.76 K/UL — HIGH (ref 3.8–10.5)

## 2023-06-16 RX ORDER — POLYETHYLENE GLYCOL 3350 17 G/17G
17 POWDER, FOR SOLUTION ORAL DAILY
Refills: 0 | Status: DISCONTINUED | OUTPATIENT
Start: 2023-06-16 | End: 2023-06-17

## 2023-06-16 RX ORDER — IBUPROFEN 200 MG
600 TABLET ORAL EVERY 6 HOURS
Refills: 0 | Status: DISCONTINUED | OUTPATIENT
Start: 2023-06-16 | End: 2023-06-17

## 2023-06-16 RX ORDER — SENNA PLUS 8.6 MG/1
2 TABLET ORAL AT BEDTIME
Refills: 0 | Status: DISCONTINUED | OUTPATIENT
Start: 2023-06-16 | End: 2023-06-17

## 2023-06-16 RX ADMIN — Medication 600 MILLIGRAM(S): at 18:04

## 2023-06-16 RX ADMIN — Medication 30 MILLIGRAM(S): at 08:35

## 2023-06-16 RX ADMIN — POLYETHYLENE GLYCOL 3350 17 GRAM(S): 17 POWDER, FOR SOLUTION ORAL at 11:49

## 2023-06-16 RX ADMIN — Medication 600 MILLIGRAM(S): at 11:49

## 2023-06-16 RX ADMIN — Medication 30 MILLIGRAM(S): at 09:30

## 2023-06-16 RX ADMIN — Medication 600 MILLIGRAM(S): at 12:00

## 2023-06-16 RX ADMIN — Medication 30 MILLIGRAM(S): at 03:06

## 2023-06-16 RX ADMIN — Medication 975 MILLIGRAM(S): at 21:08

## 2023-06-16 RX ADMIN — SCOPALAMINE 1 PATCH: 1 PATCH, EXTENDED RELEASE TRANSDERMAL at 07:00

## 2023-06-16 RX ADMIN — Medication 600 MILLIGRAM(S): at 23:59

## 2023-06-16 NOTE — PROGRESS NOTE ADULT - ASSESSMENT
MAIKOL WELLER is a 38y  now POD#1 s/p repeat  section at 39 weeks gestation, uncomplicated.    -Vital signs stable  -Hgb: 11.9 -> 10.1   -Voiding, tolerating PO, bowel function nml   -Advance care as tolerated   -Continue routine postpartum and postoperative care and education  -Healthy female infant  -Dispo: Continue inpatient management MAIKOL WELLER is a 38y  now POD#1 s/p repeat  section at 39 weeks gestation, uncomplicated.    -Vital signs stable  -Hgb: 11.9 -> 10.1   -Voiding, tolerating PO  -Ordered Miralax and senna per patient request to facilitate BM  -Advance care as tolerated   -Continue routine postpartum and postoperative care and education  -Healthy female infant  -Dispo: Continue inpatient management MAIKOL WELLER is a 38y  now POD#1 s/p repeat  section at 39 weeks gestation, uncomplicated.    -Vital signs stable  -Hgb: 11.9 -> 10.1   -Voiding, tolerating PO  -Ordered Miralax and senna per patient request to facilitate BM  -Advance care as tolerated   -Continue routine postpartum and postoperative care and education  -Healthy female infant  -Dispo: Continue inpatient management    Addendum:    Subjective Hx, Physical Exam, & Laboratory results reviewed and Pt seen and examined at bedside.  I agree with the Resident Physician's assessment and plan of care, as discussed above.  She was given the opportunity to ask questions and all were addressed.    Adriel Colmenares, DO

## 2023-06-17 VITALS
RESPIRATION RATE: 18 BRPM | TEMPERATURE: 98 F | HEART RATE: 69 BPM | SYSTOLIC BLOOD PRESSURE: 116 MMHG | DIASTOLIC BLOOD PRESSURE: 75 MMHG | OXYGEN SATURATION: 98 %

## 2023-06-17 PROCEDURE — 86900 BLOOD TYPING SEROLOGIC ABO: CPT

## 2023-06-17 PROCEDURE — 85025 COMPLETE CBC W/AUTO DIFF WBC: CPT

## 2023-06-17 PROCEDURE — 86780 TREPONEMA PALLIDUM: CPT

## 2023-06-17 PROCEDURE — 36415 COLL VENOUS BLD VENIPUNCTURE: CPT

## 2023-06-17 PROCEDURE — 86850 RBC ANTIBODY SCREEN: CPT

## 2023-06-17 PROCEDURE — 86880 COOMBS TEST DIRECT: CPT

## 2023-06-17 PROCEDURE — 86901 BLOOD TYPING SEROLOGIC RH(D): CPT

## 2023-06-17 PROCEDURE — 88304 TISSUE EXAM BY PATHOLOGIST: CPT

## 2023-06-17 RX ORDER — ACETAMINOPHEN 500 MG
2 TABLET ORAL
Qty: 40 | Refills: 0
Start: 2023-06-17 | End: 2023-06-21

## 2023-06-17 RX ORDER — OXYCODONE HYDROCHLORIDE 5 MG/1
1 TABLET ORAL
Qty: 8 | Refills: 0
Start: 2023-06-17 | End: 2023-06-18

## 2023-06-17 RX ORDER — IBUPROFEN 200 MG
1 TABLET ORAL
Qty: 20 | Refills: 0
Start: 2023-06-17 | End: 2023-06-21

## 2023-06-17 RX ADMIN — Medication 975 MILLIGRAM(S): at 03:01

## 2023-06-17 RX ADMIN — Medication 975 MILLIGRAM(S): at 08:50

## 2023-06-17 RX ADMIN — Medication 600 MILLIGRAM(S): at 05:47

## 2023-06-17 NOTE — PROGRESS NOTE ADULT - ASSESSMENT
A/P: MAIKOL WELLER is a 38y  now POD#2 s/p  section at 39 weeks gestation, uncomplicated.    -Vital Signs Stable  -Voiding, tolerating PO, bowel function nml   -Heme: Hgb 10.1  -Advance care as tolerated   -Continue routine postpartum/postoperative care and education  -Healthy female infant  -Dispo: Pt is stable for discharge to home pending attending approval.

## 2023-06-17 NOTE — PROGRESS NOTE ADULT - SUBJECTIVE AND OBJECTIVE BOX
38y Female s/p c section under spinal anesthesia with duramorph for post op analgesia on 06/15/2023    Vital Signs     T(C): 36.8 (16 Jun 2023 04:34), Max: 36.9 (15 Neo 2023 15:20)  T(F): 98.2 (16 Jun 2023 04:34), Max: 98.4 (15 Neo 2023 15:20)  HR: 53 (16 Jun 2023 04:34) (53 - 75)  BP: 94/54 (16 Jun 2023 04:34) (87/76 - 117/89)  BP(mean): 73 (15 Neo 2023 11:30) (73 - 97)  RR: 16 (16 Jun 2023 04:34) (15 - 20)  SpO2: 97% (16 Jun 2023 04:34) (93% - 98%)    Parameters below as of 16 Jun 2023 04:34  Patient On (Oxygen Delivery Method): room air            Patient's overall anesthesia satisfaction: Positive    Patients pain is well controlled    No pruritis at this time    Patient seen and doing well     No headache      No residual numbness or weakness, sensory and motor function intact    No anesthetic complications or complaints noted or reported          .            
38y Female s/p labor epidural on 06/15/2023    Vital Signs    T(C): 36.8 (16 Jun 2023 04:34), Max: 36.9 (15 Neo 2023 15:20)  T(F): 98.2 (16 Jun 2023 04:34), Max: 98.4 (15 Neo 2023 15:20)  HR: 53 (16 Jun 2023 04:34) (53 - 75)  BP: 94/54 (16 Jun 2023 04:34) (87/76 - 117/89)  BP(mean): 73 (15 Neo 2023 11:30) (73 - 97)  RR: 16 (16 Jun 2023 04:34) (15 - 20)  SpO2: 97% (16 Jun 2023 04:34) (93% - 98%)    Parameters below as of 16 Jun 2023 04:34    Patient On (Oxygen Delivery Method): room air            Patient's overall anesthesia satisfaction:Positive    Patient seen and doing well     No headache      No residual numbness or weakness, sensory and motor function intact    No anesthetic complications or complaints noted or reported                 
MAIKOL WELLER is a 38y  now POD#1 s/p repeat  section at 39 weeks gestation, uncomplicated.    S:    No acute events overnight.   The patient has no complaints.  Pain controlled with current treatment regimen.   She is ambulating without difficulty and tolerating PO.   + flatus/-BM/+ voiding   She endorses appropriate lochia, which is decreasing.   She denies fevers, chills, nausea and vomiting.   She denies lightheadedness, dizziness, palpitations, chest pain and SOB.     O:    T(C): 36.8 (23 @ 04:34), Max: 36.9 (06-15-23 @ 15:20)  HR: 53 (23 @ 04:34) (53 - 75)  BP: 94/54 (23 @ 04:34) (87/76 - 117/89)  RR: 16 (23 @ 04:34) (15 - 20)  SpO2: 97% (23 @ 04:34) (93% - 98%)    Gen: NAD, AOx3  Pulm: Resting comfortably on room air  Breast: Nontender, non-engorged   Abdomen:  Soft, non-tender, non-distended, +bowel sounds  Incision: Clean/dry/intact with Steris in place; area of bruising marked at right of incision not expanded - new bruise further lateral to marking; no tenderness  Uterus:  Fundus firm below umbilicus  VE:  Expectant lochia  Ext:  Non-tender and non-edematous                          10.1   12.76 )-----------(       ( 2023 05:11 )             31.0   
MAIKOL WELLER is a 38y  now POD#2 s/p  section at 39 weeks gestation, uncomplicated.     S:    The patient has no complaints.   Pain is controlled with current treatment regimen.   She is ambulating without difficulty and tolerating PO.   +flatus/+BM/+voiding   She endorses appropriate lochia, which is decreasing.        O:    T(C): 36.4 (23 @ 03:15), Max: 36.7 (23 @ 15:43)  HR: 69 (23 @ 03:15) (63 - 70)  BP: 116/75 (23 @ 03:15) (95/63 - 116/75)  RR: 18 (23 @ 03:15) (16 - 18)  SpO2: 98% (23 @ 03:15) (97% - 99%)    Gen: NAD, AOx3  CV: RRR  Pulm: CTAB  Breast: Nontender, not engorged   Abdomen:  Soft, non-tender, non-distended, +bowel sounds.  Uterus:  Fundus firm below umbilicus  Incision:  Clean/dry/intact with steri-strips/staples in place  VE:  +Lochia  Ext:  Non-tender, non-edematous                           10.1   12.76 )-----------( 208      ( 2023 05:11 )             31.0

## 2023-06-20 ENCOUNTER — APPOINTMENT (OUTPATIENT)
Dept: OBGYN | Facility: CLINIC | Age: 38
End: 2023-06-20
Payer: COMMERCIAL

## 2023-06-20 VITALS
BODY MASS INDEX: 30.22 KG/M2 | DIASTOLIC BLOOD PRESSURE: 84 MMHG | SYSTOLIC BLOOD PRESSURE: 126 MMHG | HEIGHT: 64 IN | WEIGHT: 177 LBS

## 2023-06-20 PROCEDURE — 0502F SUBSEQUENT PRENATAL CARE: CPT

## 2023-06-20 PROCEDURE — 0503F POSTPARTUM CARE VISIT: CPT

## 2023-06-20 NOTE — HISTORY OF PRESENT ILLNESS
[Clean/Dry/Intact] : clean, dry and intact [Healed] : healed [FreeTextEntry8] : 38-year-old female presents for postop visit.  She is status post repeat  section.  She has no complaints today.  She is feeling well since her procedure.  She is breast-feeding her baby.  She is taking Motrin and Tylenol as needed.  She denies fevers.  Minimal lochia. [de-identified] : Slight ecchymosis on the right border of the incision [de-identified] : 38-year-old female status post repeat  section for incision check, doing well [de-identified] : Steri-Strips were removed.  Wound care was reviewed with the patient.  She does have slight ecchymosis on the right border of the incision.  She was advised that she can use warm compresses as needed.  Call parameters were reviewed.  She will return to the office in 5 weeks for her postpartum visit.

## 2023-06-28 LAB — SURGICAL PATHOLOGY STUDY: SIGNIFICANT CHANGE UP

## 2023-07-14 ENCOUNTER — APPOINTMENT (OUTPATIENT)
Dept: OBGYN | Facility: CLINIC | Age: 38
End: 2023-07-14
Payer: COMMERCIAL

## 2023-07-14 VITALS
DIASTOLIC BLOOD PRESSURE: 70 MMHG | SYSTOLIC BLOOD PRESSURE: 120 MMHG | WEIGHT: 164 LBS | BODY MASS INDEX: 28 KG/M2 | HEIGHT: 64 IN

## 2023-07-14 DIAGNOSIS — Z34.91 ENCOUNTER FOR SUPERVISION OF NORMAL PREGNANCY, UNSPECIFIED, FIRST TRIMESTER: ICD-10-CM

## 2023-07-14 DIAGNOSIS — N94.89 OTHER SPECIFIED CONDITIONS ASSOCIATED WITH FEMALE GENITAL ORGANS AND MENSTRUAL CYCLE: ICD-10-CM

## 2023-07-14 DIAGNOSIS — K64.9 UNSPECIFIED HEMORRHOIDS: ICD-10-CM

## 2023-07-14 DIAGNOSIS — O09.519 SUPERVISION OF ELDERLY PRIMIGRAVIDA, UNSPECIFIED TRIMESTER: ICD-10-CM

## 2023-07-14 DIAGNOSIS — Z34.93 ENCOUNTER FOR SUPERVISION OF NORMAL PREGNANCY, UNSPECIFIED, THIRD TRIMESTER: ICD-10-CM

## 2023-07-14 DIAGNOSIS — B96.89 ACUTE VAGINITIS: ICD-10-CM

## 2023-07-14 DIAGNOSIS — N94.9 UNSPECIFIED CONDITION ASSOCIATED WITH FEMALE GENITAL ORGANS AND MENSTRUAL CYCLE: ICD-10-CM

## 2023-07-14 DIAGNOSIS — O34.219 MATERNAL CARE FOR UNSPECIFIED TYPE SCAR FROM PREVIOUS CESAREAN DELIVERY: ICD-10-CM

## 2023-07-14 DIAGNOSIS — O09.529 SUPERVISION OF ELDERLY MULTIGRAVIDA, UNSPECIFIED TRIMESTER: ICD-10-CM

## 2023-07-14 DIAGNOSIS — N76.0 ACUTE VAGINITIS: ICD-10-CM

## 2023-07-14 DIAGNOSIS — Z32.01 ENCOUNTER FOR PREGNANCY TEST, RESULT POSITIVE: ICD-10-CM

## 2023-07-14 PROCEDURE — 99213 OFFICE O/P EST LOW 20 MIN: CPT

## 2023-07-14 RX ORDER — HYDROCORTISONE 25 MG/G
2.5 CREAM TOPICAL 3 TIMES DAILY
Qty: 30 | Refills: 1 | Status: ACTIVE | COMMUNITY
Start: 2023-07-14 | End: 1900-01-01

## 2023-07-19 NOTE — PLAN
[FreeTextEntry1] : \par Subjective history consistent with hemorrhoids.  Patient was counseled on pathophysiology of hemorrhoids in the postpartum period and was directed on MiraLAX, simethicone, and hydrocortisone.  Rx was sent to pharmacy with direction.  Patient instructed only to use milk of magnesia, magnesium citrate, if absolutely necessary.  She can obtain as questions all questions were addressed

## 2023-07-19 NOTE — PHYSICAL EXAM
[Appropriately responsive] : appropriately responsive [Alert] : alert [No Acute Distress] : no acute distress [Soft] : soft [Non-tender] : non-tender [No HSM] : No HSM [Non-distended] : non-distended [No Lesions] : no lesions [No Mass] : no mass [Oriented x3] : oriented x3

## 2023-07-24 ENCOUNTER — APPOINTMENT (OUTPATIENT)
Dept: OBGYN | Facility: CLINIC | Age: 38
End: 2023-07-24
Payer: COMMERCIAL

## 2023-07-26 ENCOUNTER — APPOINTMENT (OUTPATIENT)
Dept: OBGYN | Facility: CLINIC | Age: 38
End: 2023-07-26
Payer: COMMERCIAL

## 2023-07-26 VITALS
BODY MASS INDEX: 28.17 KG/M2 | HEIGHT: 64 IN | WEIGHT: 165 LBS | SYSTOLIC BLOOD PRESSURE: 122 MMHG | DIASTOLIC BLOOD PRESSURE: 80 MMHG

## 2023-07-26 DIAGNOSIS — M25.571 PAIN IN RIGHT ANKLE AND JOINTS OF RIGHT FOOT: ICD-10-CM

## 2023-07-26 PROCEDURE — 0503F POSTPARTUM CARE VISIT: CPT

## 2023-07-26 RX ORDER — NORETHINDRONE 0.35 MG/1
0.35 TABLET ORAL DAILY
Qty: 3 | Refills: 0 | Status: ACTIVE | COMMUNITY
Start: 2023-07-26 | End: 1900-01-01

## 2023-07-26 NOTE — HISTORY OF PRESENT ILLNESS
[Clean/Dry/Intact] : clean, dry and intact [Healed] : healed [Back to Normal] : is back to normal in size [None] : no vaginal bleeding [Normal] : the vagina was normal [Examination Of The Breasts] : breasts are normal [FreeTextEntry8] : 38-year-old female presents for postpartum visit.  She is status post repeat  section.  She has no complaints today.  She is breast-feeding her baby.  She has not had her menses since delivery.  She has not had intercourse since delivery.  She is unsure what she would like to use for contraception.  Her  is planning on having a vasectomy.  She denies any symptoms of postpartum depression or anxiety. [de-identified] : Ok to return to normal activity, intercourse, and exercise.  Patient is breast-feeding.  She is interested in discussing contraception methods.  We discussed all nonhormonal and progesterone only contraception.  She is interested in trying the minipill.  Rx was provided for the minipill for 3 months.  Instructions were reviewed.  Counseling was given.  Her  is thinking about having a vasectomy in the near future.  Call parameters were reviewed.  She will return to the office in 8 weeks for her well woman exam.  The patient was given the opportunity to ask questions and all were answered to her satisfaction. [de-identified] : 38-year-old female status post primary  section for postpartum visit, doing well

## 2023-07-27 ENCOUNTER — APPOINTMENT (OUTPATIENT)
Dept: ORTHOPEDIC SURGERY | Facility: CLINIC | Age: 38
End: 2023-07-27
Payer: COMMERCIAL

## 2023-07-27 ENCOUNTER — NON-APPOINTMENT (OUTPATIENT)
Age: 38
End: 2023-07-27

## 2023-07-27 VITALS — WEIGHT: 165 LBS | BODY MASS INDEX: 28.17 KG/M2 | HEIGHT: 64 IN

## 2023-07-27 DIAGNOSIS — M76.62 ACHILLES TENDINITIS, RIGHT LEG: ICD-10-CM

## 2023-07-27 DIAGNOSIS — M76.61 ACHILLES TENDINITIS, RIGHT LEG: ICD-10-CM

## 2023-07-27 PROCEDURE — 99203 OFFICE O/P NEW LOW 30 MIN: CPT | Mod: 25

## 2023-07-27 PROCEDURE — 73610 X-RAY EXAM OF ANKLE: CPT | Mod: RT

## 2023-07-28 PROBLEM — M76.61 ACHILLES TENDINITIS OF BOTH LOWER EXTREMITIES: Status: ACTIVE | Noted: 2023-07-27

## 2023-07-28 NOTE — HISTORY OF PRESENT ILLNESS
[de-identified] : The patient is a 38 year  old R hand dominant female who presents today complaining of R achilles pain.  \par Date of Injury/Onset: ~07/14/23\par Pain:    At Rest: 0/10 \par With Activity:  5/10 \par Mechanism of injury: Overuse, running\par This is not a Work Related Injury being treated under Worker's Compensation.\par This is not an athletic injury occurring associated with an interscholastic or organized sports team.\par Quality of symptoms: aching, throbbing\par Improves with: rest, ice \par Worse with: running, stairs, direct contact\par Prior treatment: N/A\par Prior Imaging: N/A\par Out of work/sport: _, since _\par School/Sport/Position/Occupation: Speech Therapist \par Additional Information: None\par

## 2023-07-28 NOTE — IMAGING
[Right] : right ankle [Weight -] : weightbearing [de-identified] : The patient is a well appearing 38 year  old female of their stated age. \par Patient ambulates with a normal gait. \par Negative straight leg raise. \par \par Effected Foot and Ankle: BILATERAL \par \par Inspection: \par Erythema: None \par Ecchymosis: None \par Abrasions: None \par Effusion: None \par Deformity: None \par Pes [place] Valgus: Negative \par Pes Cavus: Negative \par \par Palpation: \par Crepitus: None \par Medial Malleolus: Nontender \par Lateral Malleolus: Nontender \par Syndesmosis: Nontender \par Proximal Fibula: Nontender \par ATFL: Nontender \par CFL: Nontender \par Deltoid: Nontender \par Calcaneus: Nontender \par Talar Head/Neck: Nontender \par Peroneal Tendons: Nontender\par Posterior Tibialis: Nontender \par Achilles Tendon: TENDER & Intact \par Anterior Capsule: Nontender \par Hindfoot: Nontender \par Midfoot: Nontender \par Forefoot: Nontender \par \par ROM: \par Ankle Dorsiflexion: 30 degrees \par Ankle Plantar Flexion: 45 degrees \par Motor: \par Dorsiflexion: 5 out of 5 \par Plantar Flexion: 5 out of 5 \par Inversion: 5  out of 5 \par Eversion: 5 out of 5 \par EHL: 5 out of 5 \par FHL: 5 out of 5 \par \par Provocative Testing: \par Anterior Drawer: Negative \par Syndesmosis Squeeze Test: Negative \par Jaffe's Test: Negative \par Midfoot Stability/Rotation: Negative \par Heel Raise Inversion: Normal \par Triple Hop: Normal \par Neurologic Exam: \par L4-S1 Sensation: Grossly Intact \par Vascular Exam/Pulses: \par Dorsalis Pedis: 2+ \par Posterior Tibialis: 2+ \par Capillary Refill: <2 Seconds \par Other Exams: None \par \par Pertinent Contralateral Findings: None \par \par Assessment: The patient is a 38 year old female  with bilateral ankle pain and radiographic and physical exam findings consistent with achilles tendonitis.   \par The patient’s condition is acute\par Documents/Results Reviewed Today: X-Ray right ankle \par Tests/Studies Independently Interpreted Today: X-Ray right ankle is benign. \par Pertinent findings include:  BILATERAL ANKLE: tender Achilles tendon\par Confounding medical conditions/concerns: None\par \par Plan: Patient will start physical therapy, HEP, and stretching. Advised patient to obtain full length shoe inserts. The patient will begin use of Ortho-Cor. Discussed taking OTC antiinflammatories as needed - use as directed. Modify activity as discussed.\par Tests Ordered: None \par Prescription Medications Ordered: Discussed appropriate use of OTC anti-inflammatories and analgesics (including but not limited to Aleve, Advil, Tylenol, Motrin, Ibuprofen, Voltaren gel, etc.) \par Braces/DME Ordered: None \par Activity/Work/Sports Status: None \par Additional Instructions: Full length shoe inserts and Ortho-Cor\par Follow-Up: 6 weeks \par \par The patient's current medication management of their orthopedic diagnosis was reviewed today:\par (1) We discussed a comprehensive treatment plan that included possible pharmaceutical management involving the use of prescription strength medications including but not limited to options such as oral Naprosyn 500mg BID, once daily Meloxicam 15 mg, or 500-650 mg Tylenol versus over the counter oral medications and topical prescription NSAID Pennsaid vs over the counter Voltaren gel.  Based on our extensive discussion, the patient declined prescription medication and will use over the counter Advil, Alleve, Voltaren Gel or Tylenol as directed.\par (2) There is a moderate risk of morbidity with further treatment, especially from use of prescription strength medications and possible side effects of these medications which include upset stomach with oral medications, skin reactions to topical medications and cardiac/renal issues with long term use.\par (3) I recommended that the patient follow-up with their medical physician to discuss any significant specific potential issues with long term medication use such as interactions with current medications or with exacerbation of underlying medical comorbidities.\par (4) The benefits and risks associated with use of injectable, oral or topical, prescription and over the counter anti-inflammatory medications were discussed with the patient. The patient voiced understanding of the risks including but not limited to bleeding, stroke, kidney dysfunction, heart disease, and were referred to the black box warning label for further information. \par \par I, Kerrie Wynn attest that this documentation has been prepared under the direction and in the presence of Provider Dr. Mohit Rojas. \par \par The documentation recorded by the scribe accurately reflects the services I, Dr. Mohit Rojas, personally performed and the decisions made by me.\par \par   [FreeTextEntry9] : X-Ray right ankle is benign.

## 2023-08-02 DIAGNOSIS — N39.0 URINARY TRACT INFECTION, SITE NOT SPECIFIED: ICD-10-CM

## 2023-08-02 RX ORDER — SULFAMETHOXAZOLE AND TRIMETHOPRIM 800; 160 MG/1; MG/1
800-160 TABLET ORAL
Qty: 6 | Refills: 0 | Status: ACTIVE | COMMUNITY
Start: 2023-08-02 | End: 1900-01-01

## 2023-09-07 ENCOUNTER — APPOINTMENT (OUTPATIENT)
Dept: ORTHOPEDIC SURGERY | Facility: CLINIC | Age: 38
End: 2023-09-07

## 2023-09-11 NOTE — ED PROVIDER NOTE - PATIENT PORTAL LINK FT
You can access the FollowMyHealth Patient Portal offered by [facility] by registering at the following website: [email or web address]. By joining [facility] FollowMyHealth portal, you will also be able to view your health information using other applications (apps) compatible with our system. Unanticipated physiological fall

## 2023-09-13 DIAGNOSIS — M62.89 OTHER SPECIFIED DISORDERS OF MUSCLE: ICD-10-CM

## 2023-09-22 ENCOUNTER — APPOINTMENT (OUTPATIENT)
Dept: OBGYN | Facility: CLINIC | Age: 38
End: 2023-09-22
Payer: COMMERCIAL

## 2023-09-22 VITALS
BODY MASS INDEX: 28 KG/M2 | SYSTOLIC BLOOD PRESSURE: 113 MMHG | WEIGHT: 164 LBS | DIASTOLIC BLOOD PRESSURE: 74 MMHG | HEIGHT: 64 IN

## 2023-09-22 DIAGNOSIS — Z01.419 ENCOUNTER FOR GYNECOLOGICAL EXAMINATION (GENERAL) (ROUTINE) W/OUT ABNORMAL FINDINGS: ICD-10-CM

## 2023-09-22 PROCEDURE — 99395 PREV VISIT EST AGE 18-39: CPT

## 2023-09-27 LAB
CYTOLOGY CVX/VAG DOC THIN PREP: NORMAL
HPV HIGH+LOW RISK DNA PNL CVX: NOT DETECTED

## 2023-11-28 DIAGNOSIS — Z13.79 ENCOUNTER FOR OTHER SCREENING FOR GENETIC AND CHROMOSOMAL ANOMALIES: ICD-10-CM

## 2024-03-03 ENCOUNTER — NON-APPOINTMENT (OUTPATIENT)
Age: 39
End: 2024-03-03

## 2024-04-12 ENCOUNTER — APPOINTMENT (OUTPATIENT)
Dept: OBGYN | Facility: CLINIC | Age: 39
End: 2024-04-12
Payer: COMMERCIAL

## 2024-04-12 VITALS
WEIGHT: 156 LBS | DIASTOLIC BLOOD PRESSURE: 67 MMHG | SYSTOLIC BLOOD PRESSURE: 130 MMHG | BODY MASS INDEX: 26.63 KG/M2 | HEIGHT: 64 IN

## 2024-04-12 DIAGNOSIS — F41.8 OTHER SPECIFIED ANXIETY DISORDERS: ICD-10-CM

## 2024-04-12 PROCEDURE — 99214 OFFICE O/P EST MOD 30 MIN: CPT

## 2024-04-15 PROBLEM — F41.8 ANXIETY WITH DEPRESSION: Status: ACTIVE | Noted: 2024-04-15

## 2024-04-15 RX ORDER — FLUOXETINE HYDROCHLORIDE 10 MG/1
10 CAPSULE ORAL
Qty: 90 | Refills: 0 | Status: ACTIVE | COMMUNITY
Start: 2024-04-15 | End: 1900-01-01

## 2024-04-15 NOTE — PLAN
[FreeTextEntry1] : 38-year-old female with history of anxiety and postpartum anxiety.  Patient has been seeing a therapist once weekly.  Patient feels as if anxiety has returned and now she is also getting some symptoms of depression.  She feels as if therapy alone is no longer working.  She feels as if the symptoms are affecting the quality of her life.  She is interested in starting medications.  We discussed all risk, benefits and potential complications of SSRIs.  Plan to start Prozac 10 mg daily x 3 months.  We discussed potential side effects including increased risk of suicidal ideation or negative thoughts while starting this medication.  She will return to the office in 3 months for a medication check.  The patient has information on the crisis center if needed.  Strict call parameters were reviewed.  The patient was given the opportunity to ask questions and all were answered to her satisfaction.

## 2024-04-15 NOTE — HISTORY OF PRESENT ILLNESS
[FreeTextEntry1] : 38-year-old female presents for visit to discuss anxiety and depression symptoms.  The patient delivered a baby via repeat  section approximately 10 months ago.  Patient states since the delivery of her baby she has been breast-feeding.  She has not had her menses since delivery.  The patient states that over the past month she has had mood swings as if she is going to get her menses but has not gotten it.  Patient states that she has a history of anxiety and had postpartum anxiety with her first baby.  She did not notice any symptoms of postpartum anxiety or depression after this delivery until now.  Patient states over the past month she has been more emotional.  She states that she has been feeling like crying often.  She also states that she feels anxious and as if she cannot calm down.  The patient states that she is eating and drinking okay.  She has been having difficulty sleeping as she feels that her thoughts are racing.  She denies any suicidal or homicidal ideation.  She also states over the past few weeks she has been feeling more sad than normal but thinks this could be secondary to feeling more anxious than normal.  She is seeing a therapist 1 time per week.  She states initially this was controlling her symptoms well however now feels as if she may need to start medication to help with her symptoms.  She has been reluctant to start medication in the past but is interested in trying to feel better as she now feels like the symptoms are affecting the quality of her life.

## 2024-05-17 ENCOUNTER — APPOINTMENT (OUTPATIENT)
Dept: OBGYN | Facility: CLINIC | Age: 39
End: 2024-05-17

## 2024-06-26 ENCOUNTER — APPOINTMENT (OUTPATIENT)
Dept: DERMATOLOGY | Facility: CLINIC | Age: 39
End: 2024-06-26

## 2024-09-05 ENCOUNTER — APPOINTMENT (OUTPATIENT)
Dept: DERMATOLOGY | Facility: CLINIC | Age: 39
End: 2024-09-05

## 2024-09-19 ENCOUNTER — APPOINTMENT (OUTPATIENT)
Dept: DERMATOLOGY | Facility: CLINIC | Age: 39
End: 2024-09-19

## 2024-10-08 NOTE — ED PROVIDER NOTE - CROS ED ROS STATEMENT
Subjective:       Patient ID: Latia Drew Pete Marrufo Jr, MD FACP    " all other ROS negative except as per HPI

## 2025-05-14 NOTE — PATIENT PROFILE OB - ANTIBODY SCREEN: DATE, OB PROFILE
Due for prevnar, HIB, and dtap in 1 month- nurse visit    Hep A in 6 months - nurse visit   13-Aug-2020

## 2025-07-31 NOTE — OB RN DELIVERY SUMMARY - [facility]_OBGYN_ALL_OB_FT
What Type Of Note Output Would You Prefer (Optional)?: Standard Output
Hpi Title: Evaluation of Skin Lesions
Sd